# Patient Record
Sex: MALE | Race: WHITE | NOT HISPANIC OR LATINO | Employment: FULL TIME | URBAN - METROPOLITAN AREA
[De-identification: names, ages, dates, MRNs, and addresses within clinical notes are randomized per-mention and may not be internally consistent; named-entity substitution may affect disease eponyms.]

---

## 2018-01-23 ENCOUNTER — RESOLUTE PROFESSIONAL BILLING HOSPITAL PROF FEE (OUTPATIENT)
Dept: HOSPITALIST | Facility: MEDICAL CENTER | Age: 28
End: 2018-01-23

## 2018-01-23 ENCOUNTER — APPOINTMENT (OUTPATIENT)
Dept: RADIOLOGY | Facility: MEDICAL CENTER | Age: 28
DRG: 981 | End: 2018-01-23
Attending: EMERGENCY MEDICINE

## 2018-01-23 ENCOUNTER — APPOINTMENT (OUTPATIENT)
Dept: RADIOLOGY | Facility: MEDICAL CENTER | Age: 28
DRG: 981 | End: 2018-01-23
Attending: INTERNAL MEDICINE

## 2018-01-23 ENCOUNTER — HOSPITAL ENCOUNTER (INPATIENT)
Facility: MEDICAL CENTER | Age: 28
LOS: 10 days | DRG: 981 | End: 2018-02-02
Attending: EMERGENCY MEDICINE | Admitting: HOSPITALIST

## 2018-01-23 DIAGNOSIS — T50.902A INTENTIONAL DRUG OVERDOSE, INITIAL ENCOUNTER (HCC): ICD-10-CM

## 2018-01-23 PROBLEM — T14.91XA SUICIDE ATTEMPT (HCC): Status: ACTIVE | Noted: 2018-01-23

## 2018-01-23 PROBLEM — T50.901A OVERDOSE: Status: ACTIVE | Noted: 2018-01-23

## 2018-01-23 PROBLEM — R41.82 ALTERED MENTAL STATE: Status: ACTIVE | Noted: 2018-01-23

## 2018-01-23 PROBLEM — J96.90 RESPIRATORY FAILURE REQUIRING INTUBATION (HCC): Status: ACTIVE | Noted: 2018-01-23

## 2018-01-23 PROBLEM — E87.6 HYPOKALEMIA: Status: ACTIVE | Noted: 2018-01-23

## 2018-01-23 LAB
ACTION RANGE TRIGGERED IACRT: YES
ALBUMIN SERPL BCP-MCNC: 4.5 G/DL (ref 3.2–4.9)
ALBUMIN/GLOB SERPL: 1.5 G/DL
ALP SERPL-CCNC: 87 U/L (ref 30–99)
ALT SERPL-CCNC: 26 U/L (ref 2–50)
AMPHET UR QL SCN: NEGATIVE
ANION GAP SERPL CALC-SCNC: 6 MMOL/L (ref 0–11.9)
APAP SERPL-MCNC: <10 UG/ML (ref 10–30)
APPEARANCE UR: CLEAR
APTT PPP: 29 SEC (ref 24.7–36)
AST SERPL-CCNC: 17 U/L (ref 12–45)
BACTERIA #/AREA URNS HPF: NEGATIVE /HPF
BARBITURATES UR QL SCN: NEGATIVE
BASE EXCESS BLDA CALC-SCNC: -6 MMOL/L (ref -4–3)
BASOPHILS # BLD AUTO: 0.5 % (ref 0–1.8)
BASOPHILS # BLD: 0.04 K/UL (ref 0–0.12)
BENZODIAZ UR QL SCN: NEGATIVE
BILIRUB SERPL-MCNC: 0.7 MG/DL (ref 0.1–1.5)
BILIRUB UR QL STRIP.AUTO: NEGATIVE
BODY TEMPERATURE: ABNORMAL DEGREES
BUN SERPL-MCNC: 8 MG/DL (ref 8–22)
BZE UR QL SCN: NEGATIVE
CALCIUM SERPL-MCNC: 9.6 MG/DL (ref 8.5–10.5)
CANNABINOIDS UR QL SCN: NEGATIVE
CHLORIDE SERPL-SCNC: 104 MMOL/L (ref 96–112)
CO2 BLDA-SCNC: 21 MMOL/L (ref 20–33)
CO2 SERPL-SCNC: 26 MMOL/L (ref 20–33)
COLOR UR: YELLOW
CREAT SERPL-MCNC: 0.86 MG/DL (ref 0.5–1.4)
EKG IMPRESSION: NORMAL
EOSINOPHIL # BLD AUTO: 0.02 K/UL (ref 0–0.51)
EOSINOPHIL NFR BLD: 0.2 % (ref 0–6.9)
EPI CELLS #/AREA URNS HPF: NEGATIVE /HPF
ERYTHROCYTE [DISTWIDTH] IN BLOOD BY AUTOMATED COUNT: 39.9 FL (ref 35.9–50)
ETHANOL BLD-MCNC: 0.09 G/DL
GLOBULIN SER CALC-MCNC: 3.1 G/DL (ref 1.9–3.5)
GLUCOSE SERPL-MCNC: 178 MG/DL (ref 65–99)
GLUCOSE UR STRIP.AUTO-MCNC: NEGATIVE MG/DL
GRAM STN SPEC: NORMAL
HCO3 BLDA-SCNC: 20.1 MMOL/L (ref 17–25)
HCT VFR BLD AUTO: 44.9 % (ref 42–52)
HGB BLD-MCNC: 15.6 G/DL (ref 14–18)
HYALINE CASTS #/AREA URNS LPF: ABNORMAL /LPF
IMM GRANULOCYTES # BLD AUTO: 0.04 K/UL (ref 0–0.11)
IMM GRANULOCYTES NFR BLD AUTO: 0.5 % (ref 0–0.9)
INR PPP: 0.95 (ref 0.87–1.13)
INST. QUALIFIED PATIENT IIQPT: YES
KETONES UR STRIP.AUTO-MCNC: NEGATIVE MG/DL
LEUKOCYTE ESTERASE UR QL STRIP.AUTO: ABNORMAL
LIPASE SERPL-CCNC: 18 U/L (ref 11–82)
LYMPHOCYTES # BLD AUTO: 2.06 K/UL (ref 1–4.8)
LYMPHOCYTES NFR BLD: 23.9 % (ref 22–41)
MCH RBC QN AUTO: 30.9 PG (ref 27–33)
MCHC RBC AUTO-ENTMCNC: 34.7 G/DL (ref 33.7–35.3)
MCV RBC AUTO: 88.9 FL (ref 81.4–97.8)
METHADONE UR QL SCN: NEGATIVE
MICRO URNS: ABNORMAL
MONOCYTES # BLD AUTO: 0.44 K/UL (ref 0–0.85)
MONOCYTES NFR BLD AUTO: 5.1 % (ref 0–13.4)
NEUTROPHILS # BLD AUTO: 6.03 K/UL (ref 1.82–7.42)
NEUTROPHILS NFR BLD: 69.8 % (ref 44–72)
NITRITE UR QL STRIP.AUTO: NEGATIVE
NRBC # BLD AUTO: 0 K/UL
NRBC BLD-RTO: 0 /100 WBC
O2/TOTAL GAS SETTING VFR VENT: 80 %
OPIATES UR QL SCN: NEGATIVE
OXYCODONE UR QL SCN: NEGATIVE
PCO2 BLDA: 41.5 MMHG (ref 26–37)
PCO2 TEMP ADJ BLDA: 41.7 MMHG (ref 26–37)
PCP UR QL SCN: NEGATIVE
PH BLDA: 7.29 [PH] (ref 7.4–7.5)
PH TEMP ADJ BLDA: 7.29 [PH] (ref 7.4–7.5)
PH UR STRIP.AUTO: 5.5 [PH]
PLATELET # BLD AUTO: 284 K/UL (ref 164–446)
PMV BLD AUTO: 10.6 FL (ref 9–12.9)
PO2 BLDA: 355 MMHG (ref 64–87)
PO2 TEMP ADJ BLDA: 356 MMHG (ref 64–87)
POTASSIUM SERPL-SCNC: 3.1 MMOL/L (ref 3.6–5.5)
PROPOXYPH UR QL SCN: NEGATIVE
PROT SERPL-MCNC: 7.6 G/DL (ref 6–8.2)
PROT UR QL STRIP: NEGATIVE MG/DL
PROTHROMBIN TIME: 12.4 SEC (ref 12–14.6)
RBC # BLD AUTO: 5.05 M/UL (ref 4.7–6.1)
RBC # URNS HPF: ABNORMAL /HPF
RBC UR QL AUTO: NEGATIVE
SALICYLATES SERPL-MCNC: 0 MG/DL (ref 15–25)
SAO2 % BLDA: 100 % (ref 93–99)
SIGNIFICANT IND 70042: NORMAL
SITE SITE: NORMAL
SODIUM SERPL-SCNC: 136 MMOL/L (ref 135–145)
SOURCE SOURCE: NORMAL
SP GR UR STRIP.AUTO: 1.01
SPECIMEN DRAWN FROM PATIENT: ABNORMAL
TRIGL SERPL-MCNC: 118 MG/DL (ref 0–149)
UROBILINOGEN UR STRIP.AUTO-MCNC: 0.2 MG/DL
WBC # BLD AUTO: 8.6 K/UL (ref 4.8–10.8)
WBC #/AREA URNS HPF: ABNORMAL /HPF

## 2018-01-23 PROCEDURE — A9270 NON-COVERED ITEM OR SERVICE: HCPCS | Performed by: INTERNAL MEDICINE

## 2018-01-23 PROCEDURE — 87205 SMEAR GRAM STAIN: CPT

## 2018-01-23 PROCEDURE — 700105 HCHG RX REV CODE 258: Performed by: EMERGENCY MEDICINE

## 2018-01-23 PROCEDURE — 700111 HCHG RX REV CODE 636 W/ 250 OVERRIDE (IP): Performed by: EMERGENCY MEDICINE

## 2018-01-23 PROCEDURE — 31500 INSERT EMERGENCY AIRWAY: CPT

## 2018-01-23 PROCEDURE — 83690 ASSAY OF LIPASE: CPT

## 2018-01-23 PROCEDURE — 700111 HCHG RX REV CODE 636 W/ 250 OVERRIDE (IP)

## 2018-01-23 PROCEDURE — 99291 CRITICAL CARE FIRST HOUR: CPT

## 2018-01-23 PROCEDURE — 304538 HCHG NG TUBE

## 2018-01-23 PROCEDURE — 302214 INTUBATION BOX: Performed by: EMERGENCY MEDICINE

## 2018-01-23 PROCEDURE — 700111 HCHG RX REV CODE 636 W/ 250 OVERRIDE (IP): Performed by: HOSPITALIST

## 2018-01-23 PROCEDURE — 85730 THROMBOPLASTIN TIME PARTIAL: CPT

## 2018-01-23 PROCEDURE — 94760 N-INVAS EAR/PLS OXIMETRY 1: CPT

## 2018-01-23 PROCEDURE — 93005 ELECTROCARDIOGRAM TRACING: CPT | Performed by: EMERGENCY MEDICINE

## 2018-01-23 PROCEDURE — 700102 HCHG RX REV CODE 250 W/ 637 OVERRIDE(OP): Performed by: INTERNAL MEDICINE

## 2018-01-23 PROCEDURE — 302978 HCHG BRONCHOSCOPY-DIAGNOSTIC

## 2018-01-23 PROCEDURE — 81001 URINALYSIS AUTO W/SCOPE: CPT

## 2018-01-23 PROCEDURE — 0B9J8ZZ DRAINAGE OF LEFT LOWER LUNG LOBE, VIA NATURAL OR ARTIFICIAL OPENING ENDOSCOPIC: ICD-10-PCS | Performed by: INTERNAL MEDICINE

## 2018-01-23 PROCEDURE — 770022 HCHG ROOM/CARE - ICU (200)

## 2018-01-23 PROCEDURE — 94002 VENT MGMT INPAT INIT DAY: CPT

## 2018-01-23 PROCEDURE — 87070 CULTURE OTHR SPECIMN AEROBIC: CPT

## 2018-01-23 PROCEDURE — 80307 DRUG TEST PRSMV CHEM ANLYZR: CPT

## 2018-01-23 PROCEDURE — 96366 THER/PROPH/DIAG IV INF ADDON: CPT

## 2018-01-23 PROCEDURE — 74018 RADEX ABDOMEN 1 VIEW: CPT

## 2018-01-23 PROCEDURE — 5A1935Z RESPIRATORY VENTILATION, LESS THAN 24 CONSECUTIVE HOURS: ICD-10-PCS | Performed by: EMERGENCY MEDICINE

## 2018-01-23 PROCEDURE — 51702 INSERT TEMP BLADDER CATH: CPT

## 2018-01-23 PROCEDURE — 36415 COLL VENOUS BLD VENIPUNCTURE: CPT

## 2018-01-23 PROCEDURE — 99291 CRITICAL CARE FIRST HOUR: CPT | Performed by: HOSPITALIST

## 2018-01-23 PROCEDURE — 0B9F8ZZ DRAINAGE OF RIGHT LOWER LUNG LOBE, VIA NATURAL OR ARTIFICIAL OPENING ENDOSCOPIC: ICD-10-PCS | Performed by: INTERNAL MEDICINE

## 2018-01-23 PROCEDURE — 3E1G78Z IRRIGATION OF UPPER GI USING IRRIGATING SUBSTANCE, VIA NATURAL OR ARTIFICIAL OPENING: ICD-10-PCS | Performed by: EMERGENCY MEDICINE

## 2018-01-23 PROCEDURE — 0B9F8ZX DRAINAGE OF RIGHT LOWER LUNG LOBE, VIA NATURAL OR ARTIFICIAL OPENING ENDOSCOPIC, DIAGNOSTIC: ICD-10-PCS | Performed by: INTERNAL MEDICINE

## 2018-01-23 PROCEDURE — 700105 HCHG RX REV CODE 258: Performed by: HOSPITALIST

## 2018-01-23 PROCEDURE — 96368 THER/DIAG CONCURRENT INF: CPT

## 2018-01-23 PROCEDURE — 36600 WITHDRAWAL OF ARTERIAL BLOOD: CPT

## 2018-01-23 PROCEDURE — 71045 X-RAY EXAM CHEST 1 VIEW: CPT

## 2018-01-23 PROCEDURE — 80053 COMPREHEN METABOLIC PANEL: CPT

## 2018-01-23 PROCEDURE — 85610 PROTHROMBIN TIME: CPT

## 2018-01-23 PROCEDURE — 96365 THER/PROPH/DIAG IV INF INIT: CPT

## 2018-01-23 PROCEDURE — 0BH17EZ INSERTION OF ENDOTRACHEAL AIRWAY INTO TRACHEA, VIA NATURAL OR ARTIFICIAL OPENING: ICD-10-PCS | Performed by: EMERGENCY MEDICINE

## 2018-01-23 PROCEDURE — 85025 COMPLETE CBC W/AUTO DIFF WBC: CPT

## 2018-01-23 PROCEDURE — 700111 HCHG RX REV CODE 636 W/ 250 OVERRIDE (IP): Performed by: INTERNAL MEDICINE

## 2018-01-23 PROCEDURE — 84478 ASSAY OF TRIGLYCERIDES: CPT

## 2018-01-23 PROCEDURE — 303105 HCHG CATHETER EXTRA

## 2018-01-23 PROCEDURE — 82803 BLOOD GASES ANY COMBINATION: CPT

## 2018-01-23 PROCEDURE — 99232 SBSQ HOSP IP/OBS MODERATE 35: CPT | Performed by: HOSPITALIST

## 2018-01-23 RX ORDER — SODIUM CHLORIDE 9 MG/ML
1000 INJECTION, SOLUTION INTRAVENOUS ONCE
Status: COMPLETED | OUTPATIENT
Start: 2018-01-23 | End: 2018-01-23

## 2018-01-23 RX ORDER — IPRATROPIUM BROMIDE AND ALBUTEROL SULFATE 2.5; .5 MG/3ML; MG/3ML
3 SOLUTION RESPIRATORY (INHALATION)
Status: DISCONTINUED | OUTPATIENT
Start: 2018-01-23 | End: 2018-02-02 | Stop reason: HOSPADM

## 2018-01-23 RX ORDER — CHLORHEXIDINE GLUCONATE ORAL RINSE 1.2 MG/ML
15 SOLUTION DENTAL 2 TIMES DAILY
Status: DISCONTINUED | OUTPATIENT
Start: 2018-01-23 | End: 2018-01-23

## 2018-01-23 RX ORDER — BISACODYL 10 MG
10 SUPPOSITORY, RECTAL RECTAL
Status: DISCONTINUED | OUTPATIENT
Start: 2018-01-23 | End: 2018-01-23

## 2018-01-23 RX ORDER — AMOXICILLIN 250 MG
2 CAPSULE ORAL 2 TIMES DAILY
Status: DISCONTINUED | OUTPATIENT
Start: 2018-01-23 | End: 2018-01-23

## 2018-01-23 RX ORDER — ETOMIDATE 2 MG/ML
20 INJECTION INTRAVENOUS ONCE
Status: COMPLETED | OUTPATIENT
Start: 2018-01-23 | End: 2018-01-23

## 2018-01-23 RX ORDER — SODIUM CHLORIDE 9 MG/ML
INJECTION, SOLUTION INTRAVENOUS CONTINUOUS
Status: DISCONTINUED | OUTPATIENT
Start: 2018-01-23 | End: 2018-01-23

## 2018-01-23 RX ORDER — LIDOCAINE HYDROCHLORIDE 10 MG/ML
1-2 INJECTION, SOLUTION INFILTRATION; PERINEURAL
Status: DISCONTINUED | OUTPATIENT
Start: 2018-01-23 | End: 2018-01-23

## 2018-01-23 RX ORDER — SUCCINYLCHOLINE CHLORIDE 20 MG/ML
100 INJECTION INTRAMUSCULAR; INTRAVENOUS ONCE
Status: COMPLETED | OUTPATIENT
Start: 2018-01-23 | End: 2018-01-23

## 2018-01-23 RX ORDER — AMOXICILLIN 250 MG
2 CAPSULE ORAL 2 TIMES DAILY
Status: DISCONTINUED | OUTPATIENT
Start: 2018-01-23 | End: 2018-02-02 | Stop reason: HOSPADM

## 2018-01-23 RX ORDER — BISACODYL 10 MG
10 SUPPOSITORY, RECTAL RECTAL
Status: DISCONTINUED | OUTPATIENT
Start: 2018-01-23 | End: 2018-02-02 | Stop reason: HOSPADM

## 2018-01-23 RX ORDER — FAMOTIDINE 20 MG/1
20 TABLET, FILM COATED ORAL EVERY 12 HOURS
Status: DISCONTINUED | OUTPATIENT
Start: 2018-01-23 | End: 2018-01-23

## 2018-01-23 RX ORDER — ALBUTEROL SULFATE 90 UG/1
2 AEROSOL, METERED RESPIRATORY (INHALATION) EVERY 6 HOURS PRN
COMMUNITY

## 2018-01-23 RX ORDER — POLYETHYLENE GLYCOL 3350 17 G/17G
1 POWDER, FOR SOLUTION ORAL
Status: DISCONTINUED | OUTPATIENT
Start: 2018-01-23 | End: 2018-02-02 | Stop reason: HOSPADM

## 2018-01-23 RX ORDER — POTASSIUM CHLORIDE 20 MEQ/1
40 TABLET, EXTENDED RELEASE ORAL ONCE
Status: COMPLETED | OUTPATIENT
Start: 2018-01-23 | End: 2018-01-23

## 2018-01-23 RX ORDER — MIDAZOLAM HYDROCHLORIDE 1 MG/ML
2 INJECTION INTRAMUSCULAR; INTRAVENOUS ONCE
Status: DISCONTINUED | OUTPATIENT
Start: 2018-01-23 | End: 2018-01-23

## 2018-01-23 RX ORDER — POLYETHYLENE GLYCOL 3350 17 G/17G
1 POWDER, FOR SOLUTION ORAL
Status: DISCONTINUED | OUTPATIENT
Start: 2018-01-23 | End: 2018-01-23

## 2018-01-23 RX ADMIN — PROPOFOL 5 MCG/KG/MIN: 10 INJECTION, EMULSION INTRAVENOUS at 02:30

## 2018-01-23 RX ADMIN — ENOXAPARIN SODIUM 40 MG: 100 INJECTION SUBCUTANEOUS at 08:53

## 2018-01-23 RX ADMIN — STANDARDIZED SENNA CONCENTRATE AND DOCUSATE SODIUM 2 TABLET: 8.6; 5 TABLET, FILM COATED ORAL at 12:02

## 2018-01-23 RX ADMIN — SODIUM CHLORIDE 1000 ML: 9 INJECTION, SOLUTION INTRAVENOUS at 04:15

## 2018-01-23 RX ADMIN — AMPICILLIN SODIUM AND SULBACTAM SODIUM 3 G: 2; 1 INJECTION, POWDER, FOR SOLUTION INTRAMUSCULAR; INTRAVENOUS at 04:43

## 2018-01-23 RX ADMIN — SODIUM CHLORIDE 1000 ML: 9 INJECTION, SOLUTION INTRAVENOUS at 02:40

## 2018-01-23 RX ADMIN — SODIUM CHLORIDE: 9 INJECTION, SOLUTION INTRAVENOUS at 05:30

## 2018-01-23 RX ADMIN — AMPICILLIN SODIUM AND SULBACTAM SODIUM: 100; 50 INJECTION, POWDER, FOR SOLUTION INTRAVENOUS at 08:59

## 2018-01-23 RX ADMIN — POTASSIUM CHLORIDE 40 MEQ: 1500 TABLET, EXTENDED RELEASE ORAL at 12:01

## 2018-01-23 RX ADMIN — ETOMIDATE 20 MG: 2 INJECTION INTRAVENOUS at 02:30

## 2018-01-23 RX ADMIN — SUCCINYLCHOLINE CHLORIDE 100 MG: 20 INJECTION, SOLUTION INTRAMUSCULAR; INTRAVENOUS at 02:30

## 2018-01-23 RX ADMIN — FAMOTIDINE 20 MG: 10 INJECTION, SOLUTION INTRAVENOUS at 08:53

## 2018-01-23 RX ADMIN — PROPOFOL 80 MCG/KG/MIN: 10 INJECTION, EMULSION INTRAVENOUS at 04:49

## 2018-01-23 RX ADMIN — CHLORHEXIDINE GLUCONATE 15 ML: 1.2 RINSE ORAL at 08:53

## 2018-01-23 ASSESSMENT — ENCOUNTER SYMPTOMS
EYE DISCHARGE: 0
NERVOUS/ANXIOUS: 0
PALPITATIONS: 0
MEMORY LOSS: 0
SORE THROAT: 1
NAUSEA: 0
SHORTNESS OF BREATH: 0
NECK PAIN: 0
EYE PAIN: 0
STRIDOR: 0
DEPRESSION: 1
FEVER: 0
BACK PAIN: 0
WEAKNESS: 0
LOSS OF CONSCIOUSNESS: 0
ABDOMINAL PAIN: 0
FLANK PAIN: 0
SPEECH CHANGE: 0
FALLS: 0
HALLUCINATIONS: 0
COUGH: 0
DIARRHEA: 0
BLURRED VISION: 0
EYE REDNESS: 0
FOCAL WEAKNESS: 0
VOMITING: 0
BLOOD IN STOOL: 0
SPUTUM PRODUCTION: 0
ORTHOPNEA: 0
HEADACHES: 0
HEMOPTYSIS: 0
SEIZURES: 0

## 2018-01-23 ASSESSMENT — LIFESTYLE VARIABLES
REASON UNABLE TO ASSESS: OBTUNDED
DO YOU DRINK ALCOHOL: YES

## 2018-01-23 ASSESSMENT — PAIN SCALES - GENERAL
PAINLEVEL_OUTOF10: 0
PAINLEVEL_OUTOF10: 0

## 2018-01-23 NOTE — PROGRESS NOTES
0419- RN called YARELY Ruiz RN for report.    0449- ACLS RN and CCT placed Pt on monitor in [facility] and awaited RT for transport    0500- ACLS RN, RT & CCT transported Pt [facility]    0507- Pt arrived to [facility]    Wt: 81.4kg, T: 97.9F, all other vitals in flowsheet   2RN skin assessment complete    0535- RN paged SW    0522 - DIDI Kruse called back and updated to Pt status

## 2018-01-23 NOTE — PROGRESS NOTES
"Late Entry d/t pt care:    0715 During bedside report pt sat up aggressively, wiggled R hand out of restraint and self extubated. Pt on RA SpO2>90% at all times.  Pt in no s/s of respiratory distress, reoriented pt, Dr. Slater at bedside.  Pt asked if he had an active plan to commit suicide he noded his head yes and said \"to drink myself to death.\" Pt asked if he took any drugs the night before, he shook his head no. VSS.   "

## 2018-01-23 NOTE — ED NOTES
Igor Lamberth  27 y.o.  Chief Complaint   Patient presents with   • ALOC     Pt found unconscious, GCS 3, ne evidence of head trauma.   A/Ox1, actively vomiting   • Drug Overdose     Pt wrote suicide note, possible multi drug OD. +etoh     Bib ems for above stated complaint.  Pt is obtunded on arrival, actively coughin and vomiting, but unresponsive to stimulus.  ERP at bedside on arrival.  Pt intubated to maintain airway.  VSS

## 2018-01-23 NOTE — DISCHARGE PLANNING
Medical Social Work    Referral: Legal Hold    Intervention: SW received VM from unit SW regarding a legal hold for pt. No legal hold has been initiated at this time. SW left VM for unit SW and advised a hold can be started once pt is extubated, or a hold can be started now and if pt is not expected to medically clear within the next 72 hours we can notify the court and extend the hold until pt is extubated.     Plan: Legal hold SW to remain available for further needs.

## 2018-01-23 NOTE — RESPIRATORY CARE
Intubation Assist    Intubation assist performed yes  Reason for intubation airway protection  Positive Color Change on EZCap? yes  Difficult Intubation/Number of attempts no, two attempts  Evidence of aspiration no    Airway Group ET Tube Oral 8.0-Secured At  (cm): 24 (01/23/18 0246)    Allen Vent Mode: (S)CMV (01/23/18 0243)  Rate (breaths/min): 20 (01/23/18 0243)  Vt Target (mL): 490 (01/23/18 0243)  FiO2: 80 (01/23/18 0243)  PEEP/CPAP: 8 (01/23/18 0243)

## 2018-01-23 NOTE — DISCHARGE PLANNING
Received Consult for, Suicide Attempt. Pt is currently intubated. There has not been an LH initiated yet. TC to DIDI, Nikki informing her of the issue.
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
## 2018-01-23 NOTE — PROGRESS NOTES
Two RN head to toe skin assessment completed.  The following areas of concerns are noted:Hands are black and calloused.   Appropriate interventions in place.

## 2018-01-23 NOTE — ED NOTES
0233 162/129 124hr 100% NRB,  GCS 3  Pt arrives with 20g RT AC  RT at bedside, Pharm at bedside  0235 16g PIV placed Left AC  0236  Time out Done.  0236  GCS 3  0236  20mg Etom  0236  100mg Succ  0237  Intubation attept  0239  Successful  intubation   0239  160/103  86hr sinus  100%intubated  0240  Wrist restraints placed  0240 18 OG placed

## 2018-01-23 NOTE — CARE PLAN
Problem: Respiratory:  Goal: Respiratory status will improve  Encourage slow deep breathes, cough up secretions, SpO2> 90%.     Problem: Psychosocial Needs:  Goal: Level of anxiety will decrease  Discuss plan of care with pt. Keep pt safe and away from accidental harm. Discuss ways with pt to decrease anxiety.
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
## 2018-01-23 NOTE — ED NOTES
Pt tolerating tube well at this time.  Propofol running 60 mcg/min, VSS.  Pt now able to open eyes, squeeze hands on command.  NAD

## 2018-01-23 NOTE — PROGRESS NOTES
Pulmonary/Critical Care Medicine   Progress Note    Date of service: 1/23/2018  Time: 0922    Admitted to [facility]/ICU after being found in car and intubated for airway protection  Self extubated this morning  UDS was negative  BAL was 0.09  Expressing some suicidal ideation  UOP adequate  SAD score of 6  CXR(reviewed): clear lung fields  Day #1 of Unasyn  K+ of 3.1  S/p bronch    Exam:  Gen/Neuro: awake/alert, following commands, no distress, appears stated age, speaking in full sentences, following all commands, symmetrical facial expressions, moving all x 4  HEENT: perrl, eomi, conj: pink, sclera: anicteric, oral: clear pp, mmm  Neck: supple, no adenopathy, no thyromegal  CVS: RRR, no murmur, good cap refill, 2+ dpp=, no pedal edema  Lungs: clear throughout, no chest wall tenderness  EXT: FROM, no clubbing/cyanosis/edema, no bruising  Skin: warm/dry, no turgor, no rashes    A/P:  VDRF   - self extubated this morning   - no respiratory issues   - cont RT protocols  Suicidal Ideation   - legal hold placed   - psych to eval    I spent 40 min in reviewing the patient's condition, physical examination, laboratory and imaging data, prior documentation, in discussion with RT, RN, hospitalist, and pharmacy in formulating an assessment/plan.  [id number]

## 2018-01-23 NOTE — CARE PLAN
Problem: Infection  Goal: Will remain free from infection    Intervention: Assess signs and symptoms of infection  RN monitors Pt VS and lab values to observe for S/S of infection.  Hand hygiene implemented before and after Pt care.       Problem: Skin Integrity  Goal: Risk for impaired skin integrity will decrease    Intervention: Assess risk factors for impaired skin integrity and/or pressure ulcers  RN ensures to turn Pt Q2 hours and use extra pillows for support and positioning.  Waffle pillow/mattress in place if indicated.  RN to ensure Pt is not laying on any lines or other objects that might cause tissue injury.  Mepilex used on sacrum when necessary.

## 2018-01-23 NOTE — DISCHARGE PLANNING
Had treating physician, Dr. Slater sign pt's LH and Certification. Notified Nikki TOLBERT via phone message. Faxed LH to Nikki. Pt's chart had not yet been created. Left LH in bottom drawer of pt cart. Notified bedside RN.
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
## 2018-01-23 NOTE — RESPIRATORY CARE
Extubation      Stridor present no     Events/Summary/Plan: pt self extubated. sat 98% on RA. No stridor noted (01/23/18 5877)

## 2018-01-23 NOTE — PROGRESS NOTES
0604 - DIDI Kruse called with Pt's parents phone numbers and names.  RN attempted to call both numbers with no answer.  Will attempt again later.

## 2018-01-23 NOTE — DISCHARGE PLANNING
Medical Social Work    Referral: Family Search    Intervention: MSW received a page from bedside RN and returned her phone call.  RN states that pt arrived as a possible OD and is intubated; however, medically nothing seems to be going on with pt.  Pt will possible be extubated today.  MSW conducted a [employer] search and located pt's possible parents: Krystin Kapadia ([phone]) and Janes Kapadia ([phone]).  This information was provided to bedside RN.    Plan: SW will continue to follow as needed.

## 2018-01-23 NOTE — ASSESSMENT & PLAN NOTE
Patient try to commit suicide with polypharmacy overdose. He also left a suicide note. He also called his friend to let him know where to find him. The friend called paramedics and had him brought to the hospital. Patient did have gastric lavage done. The patient remains at this point on legal hold. The courts of extended the legal hold at this point to an indefinite period.

## 2018-01-23 NOTE — PROCEDURES
"Date of Service: 1/23/2018    Procedure:  Diagnostic and therapeutic bronchoscopy with BAL    Indication: Respiratory failure, ? pneumonia    Physician:  Dr. Tunde Reed MD    Post Procedure Diagnosis:  1. Mildly inflamed airways  2. No significant purulence secretions or evidence of aspiration  3. Right lower lobe and left lower lobe segments therapeutically lavaged  4.  BAL right lower lobe sent for culture    Narrative:  Appropriate consent was obtained and \"time out\" was performed.  A flexible fiberoptic bronchoscope was then inserted through the ETT without difficulty.  All airways were evaluated to the sub-segmental level.  The airway mucosa was mildly inflamed. There is no significant evidence of aspiration or purulent secretions. The right lower lobe and left lower lobe segments were therapeutically lavaged a small aliquots of saline yielding fairly clear fluid.  No endobronchial lesions were seen.  The bronchoscope was then wedged in a segment of the right lower lobe bronchus.  30cc of saline was instilled with moderate return of slightly cloudy BAL fluid.  The BAL specimen will be sent for appropriate culture.  No immediate complications.  EBL = 0.      Tunde Reed M.D.        "

## 2018-01-24 ENCOUNTER — APPOINTMENT (OUTPATIENT)
Dept: RADIOLOGY | Facility: MEDICAL CENTER | Age: 28
DRG: 981 | End: 2018-01-24
Attending: HOSPITALIST

## 2018-01-24 ENCOUNTER — APPOINTMENT (OUTPATIENT)
Dept: RADIOLOGY | Facility: MEDICAL CENTER | Age: 28
DRG: 981 | End: 2018-01-24
Attending: INTERNAL MEDICINE

## 2018-01-24 PROBLEM — R41.82 ALTERED MENTAL STATE: Status: ACTIVE | Noted: 2018-01-24

## 2018-01-24 PROBLEM — E87.6 HYPOKALEMIA: Status: RESOLVED | Noted: 2018-01-23 | Resolved: 2018-01-24

## 2018-01-24 LAB
ALBUMIN SERPL BCP-MCNC: 4 G/DL (ref 3.2–4.9)
ALBUMIN/GLOB SERPL: 2 G/DL
ALP SERPL-CCNC: 65 U/L (ref 30–99)
ALT SERPL-CCNC: 16 U/L (ref 2–50)
AST SERPL-CCNC: 16 U/L (ref 12–45)
BASOPHILS # BLD AUTO: 0.5 % (ref 0–1.8)
BASOPHILS # BLD: 0.03 K/UL (ref 0–0.12)
BILIRUB SERPL-MCNC: 1.1 MG/DL (ref 0.1–1.5)
BUN SERPL-MCNC: 7 MG/DL (ref 8–22)
CALCIUM SERPL-MCNC: 9.1 MG/DL (ref 8.5–10.5)
CHLORIDE BLD-SCNC: 107 MMOL/L (ref 96–112)
CHLORIDE SERPL-SCNC: ABNORMAL MMOL/L (ref 96–112)
CO2 SERPL-SCNC: 23 MMOL/L (ref 20–33)
CREAT SERPL-MCNC: 0.67 MG/DL (ref 0.5–1.4)
EOSINOPHIL # BLD AUTO: 0.15 K/UL (ref 0–0.51)
EOSINOPHIL NFR BLD: 2.3 % (ref 0–6.9)
ERYTHROCYTE [DISTWIDTH] IN BLOOD BY AUTOMATED COUNT: 41.8 FL (ref 35.9–50)
GLOBULIN SER CALC-MCNC: 2 G/DL (ref 1.9–3.5)
GLUCOSE SERPL-MCNC: 92 MG/DL (ref 65–99)
HCT VFR BLD AUTO: 39.1 % (ref 42–52)
HGB BLD-MCNC: 12.9 G/DL (ref 14–18)
IMM GRANULOCYTES # BLD AUTO: 0.03 K/UL (ref 0–0.11)
IMM GRANULOCYTES NFR BLD AUTO: 0.5 % (ref 0–0.9)
LYMPHOCYTES # BLD AUTO: 1.82 K/UL (ref 1–4.8)
LYMPHOCYTES NFR BLD: 27.5 % (ref 22–41)
MAGNESIUM SERPL-MCNC: 1.8 MG/DL (ref 1.5–2.5)
MCH RBC QN AUTO: 29.9 PG (ref 27–33)
MCHC RBC AUTO-ENTMCNC: 33 G/DL (ref 33.7–35.3)
MCV RBC AUTO: 90.7 FL (ref 81.4–97.8)
MONOCYTES # BLD AUTO: 0.62 K/UL (ref 0–0.85)
MONOCYTES NFR BLD AUTO: 9.4 % (ref 0–13.4)
NEUTROPHILS # BLD AUTO: 3.97 K/UL (ref 1.82–7.42)
NEUTROPHILS NFR BLD: 59.8 % (ref 44–72)
NRBC # BLD AUTO: 0 K/UL
NRBC BLD-RTO: 0 /100 WBC
PHOSPHATE SERPL-MCNC: 3.2 MG/DL (ref 2.5–4.5)
PLATELET # BLD AUTO: 212 K/UL (ref 164–446)
PMV BLD AUTO: 10.5 FL (ref 9–12.9)
POTASSIUM BLD-SCNC: 3.7 MMOL/L (ref 3.6–5.5)
POTASSIUM SERPL-SCNC: ABNORMAL MMOL/L (ref 3.6–5.5)
PROT SERPL-MCNC: 6 G/DL (ref 6–8.2)
RBC # BLD AUTO: 4.31 M/UL (ref 4.7–6.1)
SODIUM BLD-SCNC: 139 MMOL/L (ref 135–145)
SODIUM SERPL-SCNC: ABNORMAL MMOL/L (ref 135–145)
WBC # BLD AUTO: 6.6 K/UL (ref 4.8–10.8)

## 2018-01-24 PROCEDURE — 700102 HCHG RX REV CODE 250 W/ 637 OVERRIDE(OP): Performed by: PSYCHIATRY & NEUROLOGY

## 2018-01-24 PROCEDURE — 83735 ASSAY OF MAGNESIUM: CPT

## 2018-01-24 PROCEDURE — 700102 HCHG RX REV CODE 250 W/ 637 OVERRIDE(OP): Performed by: INTERNAL MEDICINE

## 2018-01-24 PROCEDURE — 84100 ASSAY OF PHOSPHORUS: CPT

## 2018-01-24 PROCEDURE — 82435 ASSAY OF BLOOD CHLORIDE: CPT

## 2018-01-24 PROCEDURE — A9270 NON-COVERED ITEM OR SERVICE: HCPCS | Performed by: INTERNAL MEDICINE

## 2018-01-24 PROCEDURE — 700111 HCHG RX REV CODE 636 W/ 250 OVERRIDE (IP): Performed by: INTERNAL MEDICINE

## 2018-01-24 PROCEDURE — 90686 IIV4 VACC NO PRSV 0.5 ML IM: CPT | Performed by: INTERNAL MEDICINE

## 2018-01-24 PROCEDURE — 85025 COMPLETE CBC W/AUTO DIFF WBC: CPT

## 2018-01-24 PROCEDURE — 84295 ASSAY OF SERUM SODIUM: CPT

## 2018-01-24 PROCEDURE — 84132 ASSAY OF SERUM POTASSIUM: CPT

## 2018-01-24 PROCEDURE — A9270 NON-COVERED ITEM OR SERVICE: HCPCS | Performed by: PSYCHIATRY & NEUROLOGY

## 2018-01-24 PROCEDURE — 99232 SBSQ HOSP IP/OBS MODERATE 35: CPT | Performed by: HOSPITALIST

## 2018-01-24 PROCEDURE — 770001 HCHG ROOM/CARE - MED/SURG/GYN PRIV*

## 2018-01-24 PROCEDURE — 3E01340 INTRODUCTION OF INFLUENZA VACCINE INTO SUBCUTANEOUS TISSUE, PERCUTANEOUS APPROACH: ICD-10-PCS | Performed by: INTERNAL MEDICINE

## 2018-01-24 PROCEDURE — 90471 IMMUNIZATION ADMIN: CPT

## 2018-01-24 PROCEDURE — 80053 COMPREHEN METABOLIC PANEL: CPT

## 2018-01-24 PROCEDURE — 71045 X-RAY EXAM CHEST 1 VIEW: CPT

## 2018-01-24 RX ORDER — ALBUTEROL SULFATE 90 UG/1
2 AEROSOL, METERED RESPIRATORY (INHALATION)
Status: DISCONTINUED | OUTPATIENT
Start: 2018-01-24 | End: 2018-01-24

## 2018-01-24 RX ORDER — ALBUTEROL SULFATE 90 UG/1
2 AEROSOL, METERED RESPIRATORY (INHALATION) EVERY 4 HOURS PRN
Status: DISCONTINUED | OUTPATIENT
Start: 2018-01-24 | End: 2018-02-02 | Stop reason: HOSPADM

## 2018-01-24 RX ORDER — ACETAMINOPHEN 325 MG/1
650 TABLET ORAL EVERY 6 HOURS PRN
Status: DISCONTINUED | OUTPATIENT
Start: 2018-01-24 | End: 2018-02-02 | Stop reason: HOSPADM

## 2018-01-24 RX ADMIN — ACETAMINOPHEN 650 MG: 325 TABLET, FILM COATED ORAL at 18:43

## 2018-01-24 RX ADMIN — SERTRALINE 50 MG: 50 TABLET, FILM COATED ORAL at 11:30

## 2018-01-24 RX ADMIN — ACETAMINOPHEN 650 MG: 325 TABLET, FILM COATED ORAL at 10:12

## 2018-01-24 RX ADMIN — INFLUENZA A VIRUS A/MICHIGAN/45/2015 X-275 (H1N1) ANTIGEN (FORMALDEHYDE INACTIVATED), INFLUENZA A VIRUS A/HONG KONG/4801/2014 X-263B (H3N2) ANTIGEN (FORMALDEHYDE INACTIVATED), INFLUENZA B VIRUS B/PHUKET/3073/2013 ANTIGEN (FORMALDEHYDE INACTIVATED), AND INFLUENZA B VIRUS B/BRISBANE/60/2008 ANTIGEN (FORMALDEHYDE INACTIVATED) 0.5 ML: 15; 15; 15; 15 INJECTION, SUSPENSION INTRAMUSCULAR at 06:40

## 2018-01-24 RX ADMIN — STANDARDIZED SENNA CONCENTRATE AND DOCUSATE SODIUM 2 TABLET: 8.6; 5 TABLET, FILM COATED ORAL at 20:07

## 2018-01-24 ASSESSMENT — ENCOUNTER SYMPTOMS
PND: 0
EYE REDNESS: 0
SORE THROAT: 1
BLOOD IN STOOL: 0
NECK PAIN: 0
SEIZURES: 0
NAUSEA: 0
BACK PAIN: 0
LOSS OF CONSCIOUSNESS: 0
EYE DISCHARGE: 0
PALPITATIONS: 0
DIARRHEA: 0
ABDOMINAL PAIN: 0
SPUTUM PRODUCTION: 0
SPEECH CHANGE: 0
SHORTNESS OF BREATH: 0
COUGH: 0
FEVER: 0
DEPRESSION: 1
DIZZINESS: 0
NERVOUS/ANXIOUS: 0
FOCAL WEAKNESS: 0
MEMORY LOSS: 0
VOMITING: 0
FLANK PAIN: 0
HALLUCINATIONS: 0
STRIDOR: 0

## 2018-01-24 ASSESSMENT — PAIN SCALES - GENERAL
PAINLEVEL_OUTOF10: 0
PAINLEVEL_OUTOF10: 2
PAINLEVEL_OUTOF10: 0
PAINLEVEL_OUTOF10: 5
PAINLEVEL_OUTOF10: 0
PAINLEVEL_OUTOF10: 4
PAINLEVEL_OUTOF10: 0
PAINLEVEL_OUTOF10: 0

## 2018-01-24 ASSESSMENT — LIFESTYLE VARIABLES
HAVE YOU EVER FELT YOU SHOULD CUT DOWN ON YOUR DRINKING: NO
EVER FELT BAD OR GUILTY ABOUT YOUR DRINKING: NO
HAVE PEOPLE ANNOYED YOU BY CRITICIZING YOUR DRINKING: NO
TOTAL SCORE: 0
ON A TYPICAL DAY WHEN YOU DRINK ALCOHOL HOW MANY DRINKS DO YOU HAVE: 5
DO YOU DRINK ALCOHOL: YES
TOTAL SCORE: 0
EVER HAD A DRINK FIRST THING IN THE MORNING TO STEADY YOUR NERVES TO GET RID OF A HANGOVER: NO
AVERAGE NUMBER OF DAYS PER WEEK YOU HAVE A DRINK CONTAINING ALCOHOL: 1
EVER_SMOKED: YES
CONSUMPTION TOTAL: POSITIVE
TOTAL SCORE: 0
HOW MANY TIMES IN THE PAST YEAR HAVE YOU HAD 5 OR MORE DRINKS IN A DAY: 365

## 2018-01-24 NOTE — PROGRESS NOTES
Pulmonary Critical Care Progress Note        Date of Service:  1/24/2018  Chief Complaint: found unresponsive in car     History of Present Illness: 27 y.o. male admitted for VDRF from attempted suicide    ROS:  Respiratory: negative, Cardiac: negative, GI: negative.  All other systems negative. C/o mild body aching    Interval Events:  24 hour interval history reviewed    - no events overnight   - continuing legal hold per psychiatry   - neuro intact   - SB/SR 50-90s   - SBPs 90-110s   - ambulating to commode   - tolerating regular meals   - CXR(reviewed): clear lung fields   - no pharmacy issues    PFSH:  No change.  Gen: pleasant/cooperative, calm, appears stated age, no distress  Skin: warm/dry, no rashes    Respiratory:     Pulse Oximetry: 95 %    Exam: unlabored respirations, no intercostal retractions or accessory muscle use and clear to auscultation without rales or wheezes  ImagingAvailable data reviewed   Recent Labs      01/23/18   0323   ISTATAPH  7.293*   ISTATAPCO2  41.5*   ISTATAPO2  355*   ISTATATCO2  21   ZBOSYPE2RKS  100*   ISTATARTHCO3  20.1   ISTATARTBE  -6*   ISTATTEMP  37.1 C   ISTATFIO2  80   ISTATSPEC  Arterial   ISTATAPHTC  7.291*   [id number]  356*       HemoDynamics:  Pulse: (!) 57, Heart Rate (Monitored): (!) 59  NIBP: (!) 91/48       Exam: regular rate and rhythm  Imaging: Available data reviewed        Neuro:  GCS Total Paterson Coma Score: 15       Exam: no focal deficits noted mental status intact cranial nerves II through XII intact gait, including heel, toe, and tandem walking normal oriented for age x3 Motor and sensory exam grossly intact  Imaging: Available data reviewed    Fluids:  Intake/Output       01/22/18 0700 - 01/23/18 0659 (Not Admitted) 01/23/18 0700 - 01/24/18 0659 01/24/18 0700 - 01/25/18 0659      [id number] 3929-0034 Total 0132-8189 3803-4057 Total 3294-7300 9160-3623 Total       Intake    P.O.  --  -- --  360  500 860  --  -- --    P.O. -- -- -- 360 500 860 -- -- --     I.V.  --  1207.5 1207.5  932.4  -- 932.4  --  -- --    Propofol Volume -- 107.5 107.5 32.4 -- 32.4 -- -- --    IV Volume (NS bolus) -- 1000 1000 -- -- -- -- -- --    IV Volume (NS) -- 100 100 900 -- 900 -- -- --    Total Intake -- 1207.5 1207.5 1292.4 500 1792.4 -- -- --       Output    Urine  --  750 750  2300  425 2725  --  -- --    Number of Times Voided -- -- -- -- 1 x 1 x -- -- --    Indwelling Cathether --  425 2725 -- -- --    Drains  --  650 650  --  -- --  --  -- --    Oral Gastric Tube -- 650 650 -- -- -- -- -- --    Stool  --  -- --  --  -- --  --  -- --    Number of Times Stooled -- -- -- -- 0 x 0 x -- -- --    Total Output -- 1400 1400 2300 425 2725 -- -- --       Net I/O     -- -192.5 -192.5 -1007.7 75 -932.7 -- -- --        Weight: 79.2 kg (174 lb 9.7 oz)  Recent Labs      18   SODIUM  136   POTASSIUM  3.1*   CHLORIDE  104   CO2  26   BUN  8   CREATININE  0.86   CALCIUM  9.6       GI/Nutrition:  Exam: abdomen is soft and non-tender, normal active bowel sounds, without masses or organomegaly  Imaging: Available data reviewed  taking PO  Liver Function  Recent Labs      18   ALTSGPT  26   ASTSGOT  17   ALKPHOSPHAT  87   TBILIRUBIN  0.7   LIPASE  18   GLUCOSE  178*       Heme:  Recent Labs      18   RBC  5.05   HEMOGLOBIN  15.6   HEMATOCRIT  44.9   PLATELETCT  284   PROTHROMBTM  12.4   APTT  29.0   INR  0.95       Infectious Disease:  Monitored Temp  Av.2 °C (99 °F)  Min: 36.5 °C (97.7 °F)  Max: 37.5 °C (99.5 °F)  Micro: reviewed  Recent Labs      18   0237   WBC  8.6   NEUTSPOLYS  69.80   LYMPHOCYTES  23.90   MONOCYTES  5.10   EOSINOPHILS  0.20   BASOPHILS  0.50   ASTSGOT  17   ALTSGPT  26   ALKPHOSPHAT  87   TBILIRUBIN  0.7     Current Facility-Administered Medications   Medication Dose Frequency Provider Last Rate Last Dose   • Respiratory Care per Protocol   Continuous RT Tunde Reed M.D.       • senna-docusate (PERICOLACE or  SENOKOT S) 8.6-50 MG per tablet 2 Tab  2 Tab BID Tunde Reed M.D.   2 Tab at 01/23/18 1202    And   • polyethylene glycol/lytes (MIRALAX) PACKET 1 Packet  1 Packet QDAY PRN Tunde Reed M.D.        And   • magnesium hydroxide (MILK OF MAGNESIA) suspension 30 mL  30 mL QDAY PRN Tunde Reed M.D.        And   • bisacodyl (DULCOLAX) suppository 10 mg  10 mg QDAY PRN Tunde Reed M.D.       • enoxaparin (LOVENOX) inj 40 mg  40 mg DAILY Tunde Reed M.D.   40 mg at 01/23/18 0853   • fentaNYL (SUBLIMAZE) injection 25 mcg  25 mcg Q HOUR PRN Tunde Reed M.D.        Or   • fentaNYL (SUBLIMAZE) injection 50 mcg  50 mcg Q HOUR PRN Tunde Reed M.D.        Or   • fentaNYL (SUBLIMAZE) injection 100 mcg  100 mcg Q HOUR PRN Tunde Reed M.D.       • ipratropium-albuterol (DUONEB) nebulizer solution 3 mL  3 mL Q2HRS PRN (RT) Tunde Reed M.D.         Last reviewed on 1/23/2018  2:59 PM by Daniel Reid    Quality  Measures:  EKG reviewed, Medications reviewed, Labs reviewed and Radiology images reviewed  Cheek catheter: No Cheek      DVT Prophylaxis: Enoxaparin (Lovenox)  DVT prophylaxis - mechanical: SCDs  Ulcer prophylaxis: Not indicated          Problems/Plan:    VDRF   - intubated 1/22   - extubated 1/23   - cont RT/O2 protocols   - cont IS as needed  Suicidal Ideation   - psych following   - legal hold in place  Prophylaxis   - lovenox  Discussed patient condition and risk of morbidity and/or mortality with RN, RT, Therapies, Pharmacy, Dietary, , Charge nurse / hot rounds, Patient and hospitalist.

## 2018-01-24 NOTE — PROGRESS NOTES
Security called to search belongings, inventory sheet filled out and signed.  Belongings remain at bedside, black iphone, brown boots, black jacket, deadpool wallet with 2 b of a cards, 1 pair of socks all placed back in pt belonging bag and remain at bedside.

## 2018-01-24 NOTE — DISCHARGE PLANNING
Discussed pt during Am Rounds. Pt has been medically cleared. Had Dr. Slater sign medical clearance for LH faxed LH and left message for Nikki TOLBERT.

## 2018-01-24 NOTE — PROGRESS NOTES
[facility] Hospitalist Progress Note    Date of Service: 2018    Chief Complaint  27 y.o. male admitted 2018 with Intentional overdose aloc and respiratory failure Interval Problem Update  Self extubated this morning  Reports overdosed intentionnaly on alcohol      Consultants/Specialty  Dr Slater  Psych      Disposition  TBD        Review of Systems   Constitutional: Negative for fever and malaise/fatigue.   HENT: Positive for sore throat. Negative for congestion, ear discharge and nosebleeds.    Eyes: Negative for blurred vision, pain, discharge and redness.   Respiratory: Negative for cough, hemoptysis, sputum production, shortness of breath and stridor.    Cardiovascular: Negative for chest pain, palpitations, orthopnea and leg swelling.   Gastrointestinal: Negative for abdominal pain, blood in stool, diarrhea, melena, nausea and vomiting.   Genitourinary: Negative for dysuria, flank pain and hematuria.   Musculoskeletal: Negative for back pain, falls, joint pain and neck pain.   Skin: Negative.    Neurological: Negative for speech change, focal weakness, seizures, loss of consciousness, weakness and headaches.   Psychiatric/Behavioral: Positive for depression and suicidal ideas. Negative for hallucinations and memory loss. The patient is not nervous/anxious.    All other systems reviewed and are negative.     Physical Exam  Laboratory/Imaging   Hemodynamics  No data recorded.   Monitored Temp: 37.5 °C (99.5 °F)  Pulse  Av.9  Min: 81  Max: 133 Heart Rate (Monitored): 93  NIBP: 134/71      Respiratory  Allen Vent Mode: Spont, Rate (breaths/min): 24, PEEP/CPAP: 8, PEEP/CPAP: 8, FiO2: 30, P Peak (PIP): 15, P MEAN: 9.8   Respiration: (!) 46, Pulse Oximetry: 96 %     Work Of Breathing / Effort: Mild  RUL Breath Sounds: Clear, RML Breath Sounds: Clear, RLL Breath Sounds: Clear, JOSE RAMON Breath Sounds: Clear, LLL Breath Sounds: Clear    Fluids    Intake/Output Summary (Last 24 hours) at 18 1558  Last  data filed at 01/23/18 1600   Gross per 24 hour   Intake          2499.84 ml   Output             2650 ml   Net          -150.16 ml       Nutrition  Orders Placed This Encounter   Procedures   • DIET ORDER     Standing Status:   Standing     Number of Occurrences:   1     Order Specific Question:   Diet:     Answer:   Regular [1]     Physical Exam   Constitutional: He is oriented to person, place, and time. He appears well-developed and well-nourished.   HENT:   Head: Normocephalic and atraumatic.   Right Ear: External ear normal.   Left Ear: External ear normal.   Mouth/Throat: No oropharyngeal exudate.   Eyes: Right eye exhibits no discharge. Left eye exhibits no discharge. No scleral icterus.   Neck: Neck supple. No JVD present. No tracheal deviation present.   Cardiovascular: Normal rate and regular rhythm.  Exam reveals no gallop and no friction rub.    No murmur heard.  Pulmonary/Chest: Effort normal and breath sounds normal. No respiratory distress. He has no wheezes. He exhibits no tenderness.   Abdominal: Soft. Bowel sounds are normal. He exhibits no distension. There is no tenderness. There is no rebound.   Musculoskeletal: He exhibits no edema or tenderness.   Neurological: He is alert and oriented to person, place, and time. No cranial nerve deficit. He exhibits normal muscle tone.   Skin: Skin is warm and dry. He is not diaphoretic. No cyanosis. Nails show no clubbing.   Psychiatric: His mood appears not anxious.   Nursing note and vitals reviewed.      Recent Labs      01/23/18 0237   WBC  8.6   RBC  5.05   HEMOGLOBIN  15.6   HEMATOCRIT  44.9   MCV  88.9   MCH  30.9   MCHC  34.7   RDW  39.9   PLATELETCT  284   MPV  10.6     Recent Labs      01/23/18 0237   SODIUM  136   POTASSIUM  3.1*   CHLORIDE  104   CO2  26   GLUCOSE  178*   BUN  8   CREATININE  0.86   CALCIUM  9.6     Recent Labs      01/23/18 0237   APTT  29.0   INR  0.95         Recent Labs      01/23/18 0237   TRIGLYCERIDE  118           Assessment/Plan     * Overdose   Assessment & Plan    Legal hold initiated  Psych eval        Respiratory failure requiring intubation (CMS-HCC)   Assessment & Plan    Resolved  CXR clear no leukocytosis or fever d/c abx         Hypokalemia   Assessment & Plan    Replete and monitor             Reviewed items::  Labs reviewed, Medications reviewed and Radiology images reviewed  DVT prophylaxis - mechanical:  SCDs
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
## 2018-01-25 ENCOUNTER — APPOINTMENT (OUTPATIENT)
Dept: RADIOLOGY | Facility: MEDICAL CENTER | Age: 28
DRG: 981 | End: 2018-01-25
Attending: INTERNAL MEDICINE

## 2018-01-25 PROBLEM — R07.89 CHEST WALL PAIN: Status: ACTIVE | Noted: 2018-01-25

## 2018-01-25 LAB
ALBUMIN SERPL BCP-MCNC: 3.6 G/DL (ref 3.2–4.9)
ALBUMIN/GLOB SERPL: 1.5 G/DL
ALP SERPL-CCNC: 73 U/L (ref 30–99)
ALT SERPL-CCNC: 20 U/L (ref 2–50)
ANION GAP SERPL CALC-SCNC: 7 MMOL/L (ref 0–11.9)
AST SERPL-CCNC: 24 U/L (ref 12–45)
BACTERIA SPEC RESP CULT: NORMAL
BILIRUB SERPL-MCNC: 0.7 MG/DL (ref 0.1–1.5)
BUN SERPL-MCNC: 8 MG/DL (ref 8–22)
CALCIUM SERPL-MCNC: 9.5 MG/DL (ref 8.4–10.2)
CHLORIDE SERPL-SCNC: 108 MMOL/L (ref 96–112)
CO2 SERPL-SCNC: 24 MMOL/L (ref 20–33)
CREAT SERPL-MCNC: 0.83 MG/DL (ref 0.5–1.4)
EKG IMPRESSION: NORMAL
GLOBULIN SER CALC-MCNC: 2.4 G/DL (ref 1.9–3.5)
GLUCOSE SERPL-MCNC: 92 MG/DL (ref 65–99)
GRAM STN SPEC: NORMAL
MAGNESIUM SERPL-MCNC: 1.8 MG/DL (ref 1.5–2.5)
PHOSPHATE SERPL-MCNC: 4 MG/DL (ref 2.5–4.5)
POTASSIUM SERPL-SCNC: 4.1 MMOL/L (ref 3.6–5.5)
PROT SERPL-MCNC: 6 G/DL (ref 6–8.2)
SIGNIFICANT IND 70042: NORMAL
SITE SITE: NORMAL
SODIUM SERPL-SCNC: 139 MMOL/L (ref 135–145)
SOURCE SOURCE: NORMAL
TRIGL SERPL-MCNC: 106 MG/DL (ref 0–149)

## 2018-01-25 PROCEDURE — 83735 ASSAY OF MAGNESIUM: CPT

## 2018-01-25 PROCEDURE — 93010 ELECTROCARDIOGRAM REPORT: CPT | Performed by: INTERNAL MEDICINE

## 2018-01-25 PROCEDURE — 700111 HCHG RX REV CODE 636 W/ 250 OVERRIDE (IP): Performed by: INTERNAL MEDICINE

## 2018-01-25 PROCEDURE — 700102 HCHG RX REV CODE 250 W/ 637 OVERRIDE(OP): Performed by: PSYCHIATRY & NEUROLOGY

## 2018-01-25 PROCEDURE — 99232 SBSQ HOSP IP/OBS MODERATE 35: CPT | Performed by: HOSPITALIST

## 2018-01-25 PROCEDURE — 93005 ELECTROCARDIOGRAM TRACING: CPT | Performed by: HOSPITALIST

## 2018-01-25 PROCEDURE — 84100 ASSAY OF PHOSPHORUS: CPT

## 2018-01-25 PROCEDURE — 770001 HCHG ROOM/CARE - MED/SURG/GYN PRIV*

## 2018-01-25 PROCEDURE — A9270 NON-COVERED ITEM OR SERVICE: HCPCS | Performed by: PSYCHIATRY & NEUROLOGY

## 2018-01-25 PROCEDURE — 80053 COMPREHEN METABOLIC PANEL: CPT

## 2018-01-25 PROCEDURE — 700102 HCHG RX REV CODE 250 W/ 637 OVERRIDE(OP): Performed by: INTERNAL MEDICINE

## 2018-01-25 PROCEDURE — 84478 ASSAY OF TRIGLYCERIDES: CPT

## 2018-01-25 PROCEDURE — A9270 NON-COVERED ITEM OR SERVICE: HCPCS | Performed by: INTERNAL MEDICINE

## 2018-01-25 RX ORDER — NICOTINE 21 MG/24HR
14 PATCH, TRANSDERMAL 24 HOURS TRANSDERMAL
Status: DISCONTINUED | OUTPATIENT
Start: 2018-01-25 | End: 2018-02-02 | Stop reason: HOSPADM

## 2018-01-25 RX ADMIN — STANDARDIZED SENNA CONCENTRATE AND DOCUSATE SODIUM 2 TABLET: 8.6; 5 TABLET, FILM COATED ORAL at 08:10

## 2018-01-25 RX ADMIN — ENOXAPARIN SODIUM 40 MG: 100 INJECTION SUBCUTANEOUS at 08:10

## 2018-01-25 RX ADMIN — NICOTINE 14 MG: 14 PATCH, EXTENDED RELEASE TRANSDERMAL at 22:20

## 2018-01-25 RX ADMIN — SERTRALINE 50 MG: 50 TABLET, FILM COATED ORAL at 08:10

## 2018-01-25 ASSESSMENT — PAIN SCALES - GENERAL
PAINLEVEL_OUTOF10: 2
PAINLEVEL_OUTOF10: 4
PAINLEVEL_OUTOF10: 0
PAINLEVEL_OUTOF10: 2
PAINLEVEL_OUTOF10: 2
PAINLEVEL_OUTOF10: 3
PAINLEVEL_OUTOF10: 2
PAINLEVEL_OUTOF10: 0
PAINLEVEL_OUTOF10: 2
PAINLEVEL_OUTOF10: 2

## 2018-01-25 ASSESSMENT — ENCOUNTER SYMPTOMS
COUGH: 0
LOSS OF CONSCIOUSNESS: 0
DIZZINESS: 0
MEMORY LOSS: 0
EYES NEGATIVE: 1
ABDOMINAL PAIN: 0
FOCAL WEAKNESS: 0
FEVER: 0
SPUTUM PRODUCTION: 0
NERVOUS/ANXIOUS: 0
DEPRESSION: 1
VOMITING: 0
SEIZURES: 0
PALPITATIONS: 0
SPEECH CHANGE: 0
HALLUCINATIONS: 0
BACK PAIN: 0
NECK PAIN: 0
SHORTNESS OF BREATH: 0
FLANK PAIN: 0
NAUSEA: 0
BLOOD IN STOOL: 0

## 2018-01-25 NOTE — DISCHARGE PLANNING
Medical Social Work    Referral: Legal Hold    Intervention: Pt is medically cleared and does not have insurance. SW sent referral to [facility] and faxed copy of legal hold separately.     Plan: Waiting on transfer to in patient psych facility.

## 2018-01-25 NOTE — DISCHARGE PLANNING
Medical Social Work    Referral: Legal Hold    Intervention: Signed petition sent to Alva Patterson's office for extension.    Plan: Pt awaiting transfer to psychiatric facility.

## 2018-01-25 NOTE — CARE PLAN
Problem: Safety  Goal: Will remain free from injury    Intervention: Provide assistance with mobility  Patient assessed Qhour and as needed, safety measures in place: bed in low position, call bell in reach, appropriate side rails up (2 lower), bed alarm on, patient room near nursing station. Will continue to monitor. Legal hold, close obs.       Problem: Pain Management  Goal: Pain level will decrease to patient's comfort goal    Intervention: Follow pain managment plan developed in collaboration with patient and Interdisciplinary Team  Patient pain assessed Qhour and as needed. Patient repositioned and medicated per Dr order. Will continue to monitor.

## 2018-01-25 NOTE — PROGRESS NOTES
Pulmonary Critical Care Progress Note        Date of Service:  1/25/2018  Chief Complaint: found unresponsive in car     History of Present Illness: 27 y.o. male admitted for VDRF from attempted suicide    ROS:  Mild chest discomfort.  No shortness of breath    Interval Events:  24 hour interval history reviewed    - no events overnight   - neuro intact, but subdued   - SB/SR 50-60s   - SBP 110s   - ambulating to the bathroom   - tolerating regular meals   - adequate UOP   - no respiratory issues   - sertraline started by psych    - no pharmacy issues    Yesterday's Events:   - no events overnight   - continuing legal hold per psychiatry   - neuro intact   - SB/SR 50-90s   - SBPs 90-110s   - ambulating to commode   - tolerating regular meals   - CXR(reviewed): clear lung fields   - no pharmacy issues    PFSH:  No change.  Gen: pleasant/cooperative, calm, appears stated age, no distress (no change)  Skin: warm/dry, no rashes (no change)    Respiratory:     Pulse Oximetry: 95 %    Exam: clear throughout, no wheezing (No change)  ImagingAvailable data reviewed   Recent Labs      01/23/18   0323   ISTATAPH  7.293*   ISTATAPCO2  41.5*   ISTATAPO2  355*   ISTATATCO2  21   [id number]  100*   ISTATARTHCO3  20.1   ISTATARTBE  -6*   ISTATTEMP  37.1 C   ISTATFIO2  80   ISTATSPEC  Arterial   ISTATAPHTC  7.291*   [id number]  356*       HemoDynamics:  Pulse: (!) 53, Heart Rate (Monitored): (!) 53  NIBP: 118/72       Exam: RRR, no murmur, no pedal edema, 2+ dpp=, good cap refill  Imaging: Available data reviewed        Neuro:  GCS Total Jerome Coma Score: 15       Exam: clear speech, CN II- XII grossly intact, motor/sensory intact, normal gait, speaking in full sentences, answering all appropriately  Imaging: Available data reviewed    Fluids:  Intake/Output       01/23/18 0700 - 01/24/18 0659 01/24/18 0700 - 01/25/18 0659 01/25/18 0700 - 01/26/18 0659      [id number] 3848-3654 Total 6328-4155 4213-3140 Total 8223-4593 4315-6507  Total       Intake    P.O.  360  500 860  900  1500 2400  --  -- --    P.O. 360 500  2400 -- -- --    I.V.  932.4  -- 932.4  --  -- --  --  -- --    Propofol Volume 32.4 -- 32.4 -- -- -- -- -- --    IV Volume (NS) 900 -- 900 -- -- -- -- -- --    Total Intake 1292.4 500 1792.4 900 1500 2400 -- -- --       Output    Urine  2300  425 2725  --  -- --  --  -- --    Number of Times Voided -- 2 x 2 x 4 x 4 x 8 x -- -- --    Indwelling Cathether 2300 425 2725 -- -- -- -- -- --    Stool  --  -- --  --  -- --  --  -- --    Number of Times Stooled -- 0 x 0 x -- 0 x 0 x -- -- --    Total Output 2300 425 2725 -- -- -- -- -- --       Net I/O     -1007.7 75 -932.7 900 1500 2400 -- -- --        Weight: 78.9 kg (173 lb 15.1 oz)  Recent Labs      18   06   SODIUM  136  See comment   POTASSIUM  3.1*  See comment   CHLORIDE  104  See comment   CO2  26  23   BUN  8  7*   CREATININE  0.86  0.67   MAGNESIUM   --   1.8   PHOSPHORUS   --   3.2   CALCIUM  9.6  9.1       GI/Nutrition:  Exam: (+)bs, soft, NT/ND, no masses  Imaging: Available data reviewed  Tolerating regular diet   Liver Function  Recent Labs      18   0626   ALTSGPT  26  16   ASTSGOT  17  16   ALKPHOSPHAT  87  65   TBILIRUBIN  0.7  1.1   LIPASE  18   --    GLUCOSE  178*  92       Heme:  Recent Labs      18   0626   RBC  5.05  4.31*   HEMOGLOBIN  15.6  12.9*   HEMATOCRIT  44.9  39.1*   PLATELETCT  284  212   PROTHROMBTM  12.4   --    APTT  29.0   --    INR  0.95   --        Infectious Disease:  Temp  Av.7 °C (98 °F)  Min: 36.3 °C (97.3 °F)  Max: 37.1 °C (98.7 °F)  Micro: reviewed  Recent Labs      18   0237  18   0626   WBC  8.6  6.6   NEUTSPOLYS  69.80  59.80   LYMPHOCYTES  23.90  27.50   MONOCYTES  5.10  9.40   EOSINOPHILS  0.20  2.30   BASOPHILS  0.50  0.50   ASTSGOT  17  16   ALTSGPT  26  16   ALKPHOSPHAT  87  65   TBILIRUBIN  0.7  1.1     Current Facility-Administered  Medications   Medication Dose Frequency Provider Last Rate Last Dose   • acetaminophen (TYLENOL) tablet 650 mg  650 mg Q6HRS PRN Carole Slater M.D.   650 mg at 01/24/18 1843   • sertraline (ZOLOFT) tablet 50 mg  50 mg DAILY Greg Spence D.O.   50 mg at 01/24/18 1130   • albuterol inhaler 2 Puff  2 Puff Q4HRS PRN Bandar Cerda M.D.       • Respiratory Care per Protocol   Continuous RT Tunde Reed M.D.       • senna-docusate (PERICOLACE or SENOKOT S) 8.6-50 MG per tablet 2 Tab  2 Tab BID Tunde Reed M.D.   2 Tab at 01/24/18 2007    And   • polyethylene glycol/lytes (MIRALAX) PACKET 1 Packet  1 Packet QDAY PRN Tunde Reed M.D.        And   • magnesium hydroxide (MILK OF MAGNESIA) suspension 30 mL  30 mL QDAY PRN Tunde Reed M.D.        And   • bisacodyl (DULCOLAX) suppository 10 mg  10 mg QDAY PRN Tunde Reed M.D.       • enoxaparin (LOVENOX) inj 40 mg  40 mg DAILY Tunde Reed M.D.   Stopped at 01/24/18 0900   • ipratropium-albuterol (DUONEB) nebulizer solution 3 mL  3 mL Q2HRS PRN (RT) Tunde Reed M.D.         Last reviewed on 1/24/2018  5:37 AM by Michelle Chong R.N.    Quality  Measures:  EKG reviewed, Medications reviewed, Labs reviewed and Radiology images reviewed  Cheek catheter: No Cheek      DVT Prophylaxis: Enoxaparin (Lovenox)  DVT prophylaxis - mechanical: SCDs  Ulcer prophylaxis: Not indicated          Problems/Plan:    VDRF   - intubated 1/22   - extubated 1/23   - cont RT/O2 protocols   - cont IS as needed  Suicidal Ideation   - psych following   - legal hold in place   - awaiting transfer to inpatient psych facility  Prophylaxis   - lovenox    Pt remains stable and medically cleared for transfer to an inpatient psych unit.    Discussed patient condition and risk of morbidity and/or mortality with RN, RT, Therapies, Pharmacy, Dietary, , Charge nurse / hot rounds, Patient and hospitalist.    [id number]

## 2018-01-25 NOTE — DISCHARGE PLANNING
Legal Hold    Date of Legal Hold: 18  Time of Legal Hold: 1204    Where was patient when legal hold initiated: Admitted to [facility]    Legal Hold will : 18 1700    Medical Clearance Complete: yes    Psychiatric Certification Complete: yes    Paperwork sent to  for extension: yes 18 3753    What doctor will be signing the extension: Dr. Spence    Extension paperwork attained: In process    Will patient present to [facility] mental health on Wednesday morning?: yes    Referral placed to Psychiatric Facility: yes    Ongoing Plan: Pt waiting on transfer to psychiatric facility.

## 2018-01-25 NOTE — CARE PLAN
Problem: Safety  Goal: Will remain free from injury    Intervention: Provide assistance with mobility  Pt ambulating with stand-by assistance. Pt steady and mobilizing well without difficulty.      Problem: Respiratory:  Goal: Respiratory status will improve    Intervention: Educate and encourage incentive spirometry usage  IS explained and given to patient, Hourly use discussed. Great effort noted at 9959-2614 cc. Pt has intermittent cough, no sputum at this time.       Problem: Safety:  Goal: Will remain free from injury    Intervention: Provide assistance with mobility  Pt ambulating with stand-by assistance. Pt steady and mobilizing well without difficulty.      Problem: Psychosocial Needs:  Goal: Ability to identify and develop effective coping behavior will improve    Intervention: Provide emotional support  Discussed with patient Q 15 min close observation monitoring and no self harm.  Emotional support offered/provided.

## 2018-01-25 NOTE — PROGRESS NOTES
[facility] Hospitalist Progress Note    Date of Service: 2018    Chief Complaint  27 y.o. male admitted 2018 with Intentional overdose aloc and respiratory failure Interval Problem Update  On room air  No overnight events tolerating diet  Consultants/Specialty  Dr Slater  Psych      Disposition  TBD        Review of Systems   Constitutional: Negative for fever.   HENT: Positive for sore throat (improved). Negative for congestion, ear discharge, hearing loss, nosebleeds and tinnitus.    Eyes: Negative for discharge and redness.   Respiratory: Negative for cough, sputum production, shortness of breath and stridor.    Cardiovascular: Negative for chest pain, palpitations, leg swelling and PND.   Gastrointestinal: Negative for abdominal pain, blood in stool, diarrhea, melena, nausea and vomiting.   Genitourinary: Negative for flank pain and hematuria.   Musculoskeletal: Negative for back pain and neck pain.   Skin: Negative.    Neurological: Negative for dizziness, speech change, focal weakness, seizures and loss of consciousness.   Psychiatric/Behavioral: Positive for depression. Negative for hallucinations and memory loss. The patient is not nervous/anxious.    All other systems reviewed and are negative.     Physical Exam  Laboratory/Imaging   Hemodynamics  Temp (24hrs), Av.8 °C (98.2 °F), Min:36.7 °C (98 °F), Max:37.1 °C (98.7 °F)   Temperature: 36.7 °C (98 °F), Monitored Temp: 37.4 °C (99.3 °F)  Pulse  Av.3  Min: 51  Max: 133 Heart Rate (Monitored): 65  NIBP: 120/72      Respiratory      Respiration: 19, Pulse Oximetry: 96 %     Work Of Breathing / Effort: Mild  RUL Breath Sounds: Clear, RML Breath Sounds: Clear, RLL Breath Sounds: Clear, JOSE RAMON Breath Sounds: Clear, LLL Breath Sounds: Clear    Fluids    Intake/Output Summary (Last 24 hours) at 18 1653  Last data filed at 18 1600   Gross per 24 hour   Intake             1300 ml   Output             1125 ml   Net              175 ml        Nutrition  Orders Placed This Encounter   Procedures   • DIET ORDER     Standing Status:   Standing     Number of Occurrences:   1     Order Specific Question:   Diet:     Answer:   Regular [1]     Physical Exam   Constitutional: He is oriented to person, place, and time. He appears well-developed and well-nourished.   HENT:   Head: Normocephalic and atraumatic.   Right Ear: External ear normal.   Left Ear: External ear normal.   Mouth/Throat: No oropharyngeal exudate.   Eyes: Conjunctivae are normal. Right eye exhibits no discharge. Left eye exhibits no discharge. No scleral icterus.   Neck: Neck supple. No JVD present. No tracheal deviation present.   Cardiovascular: Normal rate and regular rhythm.  Exam reveals no gallop and no friction rub.    No murmur heard.  Pulmonary/Chest: Effort normal and breath sounds normal. No stridor. No respiratory distress. He has no wheezes. He has no rales. He exhibits no tenderness.   Abdominal: Soft. Bowel sounds are normal. He exhibits no distension. There is no tenderness. There is no rebound and no guarding.   Musculoskeletal: He exhibits no edema or tenderness.   Neurological: He is alert and oriented to person, place, and time. No cranial nerve deficit. He exhibits normal muscle tone.   Skin: Skin is warm and dry. He is not diaphoretic. No cyanosis or erythema. Nails show no clubbing.   Psychiatric: His mood appears not anxious. He exhibits a depressed mood.   Nursing note and vitals reviewed.      Recent Labs      01/23/18 0237 01/24/18   0626   WBC  8.6  6.6   RBC  5.05  4.31*   HEMOGLOBIN  15.6  12.9*   HEMATOCRIT  44.9  39.1*   MCV  88.9  90.7   MCH  30.9  29.9   MCHC  34.7  33.0*   RDW  39.9  41.8   PLATELETCT  284  212   MPV  10.6  10.5     Recent Labs      01/23/18   0237  01/24/18   0626   SODIUM  136  See comment   POTASSIUM  3.1*  See comment   CHLORIDE  104  See comment   CO2  26  23   GLUCOSE  178*  92   BUN  8  7*   CREATININE  0.86  0.67   CALCIUM   9.6  9.1     Recent Labs      01/23/18   0237   APTT  29.0   INR  0.95         Recent Labs      01/23/18   0237   TRIGLYCERIDE  118          Assessment/Plan     * Overdose   Assessment & Plan    Legal hold initiated  Evaluated by psychiatry legal hold extended discussed with Dr. Galdamez        Respiratory failure requiring intubation (CMS-[facility])   Assessment & Plan    Resolved          Altered mental state   Assessment & Plan    resolved            Reviewed items::  Labs reviewed, Medications reviewed and Radiology images reviewed  Cheek catheter::  No Cheek  DVT prophylaxis - mechanical:  SCDs

## 2018-01-25 NOTE — PROGRESS NOTES
Dr Baron and team here for morning rounds, plan of care was discussed. Informed MD's of EKG with ST elevation in Lead II, 12 lead ordered. Pt still waiting for room transfer.

## 2018-01-26 ENCOUNTER — APPOINTMENT (OUTPATIENT)
Dept: RADIOLOGY | Facility: MEDICAL CENTER | Age: 28
DRG: 981 | End: 2018-01-26
Attending: INTERNAL MEDICINE

## 2018-01-26 PROBLEM — J96.01 ACUTE RESPIRATORY FAILURE WITH HYPOXIA (HCC): Status: ACTIVE | Noted: 2018-01-23

## 2018-01-26 PROBLEM — J45.20 MILD INTERMITTENT ASTHMA WITHOUT COMPLICATION: Status: ACTIVE | Noted: 2018-01-26

## 2018-01-26 PROCEDURE — 700102 HCHG RX REV CODE 250 W/ 637 OVERRIDE(OP): Performed by: PSYCHIATRY & NEUROLOGY

## 2018-01-26 PROCEDURE — 770001 HCHG ROOM/CARE - MED/SURG/GYN PRIV*

## 2018-01-26 PROCEDURE — 99232 SBSQ HOSP IP/OBS MODERATE 35: CPT | Performed by: HOSPITALIST

## 2018-01-26 PROCEDURE — A9270 NON-COVERED ITEM OR SERVICE: HCPCS | Performed by: PSYCHIATRY & NEUROLOGY

## 2018-01-26 PROCEDURE — 700111 HCHG RX REV CODE 636 W/ 250 OVERRIDE (IP): Performed by: INTERNAL MEDICINE

## 2018-01-26 RX ADMIN — SERTRALINE 50 MG: 50 TABLET, FILM COATED ORAL at 08:50

## 2018-01-26 RX ADMIN — ENOXAPARIN SODIUM 40 MG: 100 INJECTION SUBCUTANEOUS at 08:51

## 2018-01-26 ASSESSMENT — PAIN SCALES - GENERAL
PAINLEVEL_OUTOF10: 0

## 2018-01-26 ASSESSMENT — ENCOUNTER SYMPTOMS
COUGH: 0
WEIGHT LOSS: 0
EYE PAIN: 0
VOMITING: 0
HEMOPTYSIS: 0
SPUTUM PRODUCTION: 0
HALLUCINATIONS: 0
LOSS OF CONSCIOUSNESS: 0
FLANK PAIN: 0
PND: 0
BLURRED VISION: 0
CHILLS: 0
EYE DISCHARGE: 0
SEIZURES: 0
BLOOD IN STOOL: 0
HEADACHES: 0
MEMORY LOSS: 0
EYE REDNESS: 0
FEVER: 0
WHEEZING: 0
WEAKNESS: 0
STRIDOR: 0
NAUSEA: 0
ORTHOPNEA: 0
SPEECH CHANGE: 0
FALLS: 0
DEPRESSION: 1
NECK PAIN: 0
BACK PAIN: 0
FOCAL WEAKNESS: 0
NERVOUS/ANXIOUS: 0
SHORTNESS OF BREATH: 0
DIARRHEA: 0
ABDOMINAL PAIN: 0

## 2018-01-26 NOTE — CARE PLAN
Problem: Safety  Goal: Will remain free from injury  Outcome: PROGRESSING AS EXPECTED      Problem: Safety:  Goal: Will remain free from injury  Outcome: PROGRESSING AS EXPECTED

## 2018-01-26 NOTE — CARE PLAN
Problem: Respiratory:  Goal: Respiratory status will improve  Outcome: MET Date Met: 01/26/18

## 2018-01-26 NOTE — DISCHARGE PLANNING
Medical Social Work    Referral: Legal Hold    Intervention: SW received copy of filed extension petition. Copy sent to unit SW to place in pt's chart.     Plan: Pt awaiting transfer to psychiatric facility.

## 2018-01-26 NOTE — CARE PLAN
Problem: Safety  Goal: Will remain free from injury  Outcome: PROGRESSING AS EXPECTED  Sitter at bedside. Q15 minute obs in place.    Problem: Smoking Cessation  Intervention: MEDS TO AID WITH SMOKING CESSATION PER ORDER  Nicotine patch started.

## 2018-01-26 NOTE — PROGRESS NOTES
[facility] Hospitalist Progress Note    Date of Service: 2018    Chief Complaint  27 y.o. male admitted 2018 with Intentional overdose aloc and respiratory failure Interval Problem Update   Complained of chest wall pain reproducible on exam with palpation  Tolerating diet  Consultants/Specialty  Dr Slater  Psych      Disposition  Transfer to psych facility        Review of Systems   Constitutional: Negative for chills, fever and weight loss.   HENT: Negative.  Negative for hearing loss and tinnitus.    Eyes: Negative.    Respiratory: Negative for cough, sputum production, shortness of breath and wheezing.    Cardiovascular: Positive for chest pain (chest wall). Negative for palpitations, leg swelling and PND.   Gastrointestinal: Negative for abdominal pain, blood in stool, melena, nausea and vomiting.   Genitourinary: Negative for dysuria, flank pain, hematuria and urgency.   Musculoskeletal: Negative for back pain, falls and neck pain.   Skin: Negative.    Neurological: Negative for dizziness, speech change, focal weakness, seizures, loss of consciousness and headaches.   Psychiatric/Behavioral: Positive for depression. Negative for hallucinations and memory loss. The patient is not nervous/anxious.    All other systems reviewed and are negative.     Physical Exam  Laboratory/Imaging   Hemodynamics  Temp (24hrs), Av.7 °C (98 °F), Min:36.2 °C (97.2 °F), Max:37 °C (98.6 °F)   Temperature: 37 °C (98.6 °F)  Pulse  Av.1  Min: 46  Max: 133 Heart Rate (Monitored): 83  NIBP: 127/75      Respiratory      Respiration: 12, Pulse Oximetry: 98 %     Work Of Breathing / Effort: Mild;Shallow (hourly IS use encouraged)  RUL Breath Sounds: Clear, RML Breath Sounds: Clear;Diminished, RLL Breath Sounds: Clear;Diminished, JOSE RAMON Breath Sounds: Clear, LLL Breath Sounds: Clear;Diminished    Fluids    Intake/Output Summary (Last 24 hours) at 18 1632  Last data filed at 18 1500   Gross per 24 hour   Intake              2790 ml   Output                0 ml   Net             2790 ml       Nutrition  Orders Placed This Encounter   Procedures   • DIET ORDER     Standing Status:   Standing     Number of Occurrences:   1     Order Specific Question:   Diet:     Answer:   Regular [1]     Physical Exam   Constitutional: He is oriented to person, place, and time. He appears well-developed and well-nourished.   HENT:   Head: Normocephalic and atraumatic.   Right Ear: External ear normal.   Mouth/Throat: No oropharyngeal exudate.   Eyes: Right eye exhibits no discharge. Left eye exhibits no discharge. No scleral icterus.   Neck: Neck supple. No JVD present. No tracheal deviation present.   Cardiovascular: Normal rate and regular rhythm.  Exam reveals no gallop and no friction rub.    No murmur heard.  Pulmonary/Chest: Effort normal and breath sounds normal. No respiratory distress. He has no wheezes. He exhibits tenderness (reproducing symptoms).   Abdominal: Soft. Bowel sounds are normal. He exhibits no distension. There is no tenderness. There is no rebound.   Musculoskeletal: He exhibits no edema or tenderness.   Neurological: He is alert and oriented to person, place, and time. No cranial nerve deficit. He exhibits normal muscle tone.   Skin: Skin is warm and dry. He is not diaphoretic. No cyanosis or erythema. Nails show no clubbing.   Psychiatric: Cognition and memory are normal. He exhibits a depressed mood.   Nursing note and vitals reviewed.      Recent Labs      01/23/18 0237 01/24/18   0626   WBC  8.6  6.6   RBC  5.05  4.31*   HEMOGLOBIN  15.6  12.9*   HEMATOCRIT  44.9  39.1*   MCV  88.9  90.7   MCH  30.9  29.9   MCHC  34.7  33.0*   RDW  39.9  41.8   PLATELETCT  284  212   MPV  10.6  10.5     Recent Labs      01/23/18 0237 01/24/18   0626  01/25/18   0540   SODIUM  136  See comment  139   POTASSIUM  3.1*  See comment  4.1   CHLORIDE  104  See comment  108   CO2  26  23  24   GLUCOSE  178*  92  92   BUN  8  7*  8    CREATININE  0.86  0.67  0.83   CALCIUM  9.6  9.1  9.5     Recent Labs      01/23/18   0237   APTT  29.0   INR  0.95         Recent Labs      01/23/18   0237  01/25/18   0540   TRIGLYCERIDE  118  106          Assessment/Plan     Overdose   Assessment & Plan     Legal hold   Evaluated by psychiatry legal hold extended and started on Zoloft          Respiratory failure requiring intubation (CMS-[facility])   Assessment & Plan     Resolved             Chest wall pain   Assessment & Plan     Musculoskeletal in etiology  Tylenol as needed  EKG with no acute ischemic changes             Altered mental state   Assessment & Plan     resolved                                                                          Reviewed items::  Labs reviewed, Medications reviewed and Radiology images reviewed  Cheek catheter::  No Cheek  DVT prophylaxis - mechanical:  SCDs

## 2018-01-27 ENCOUNTER — APPOINTMENT (OUTPATIENT)
Dept: RADIOLOGY | Facility: MEDICAL CENTER | Age: 28
DRG: 981 | End: 2018-01-27
Attending: INTERNAL MEDICINE

## 2018-01-27 PROCEDURE — 770001 HCHG ROOM/CARE - MED/SURG/GYN PRIV*

## 2018-01-27 PROCEDURE — A9270 NON-COVERED ITEM OR SERVICE: HCPCS | Performed by: PSYCHIATRY & NEUROLOGY

## 2018-01-27 PROCEDURE — 700111 HCHG RX REV CODE 636 W/ 250 OVERRIDE (IP): Performed by: INTERNAL MEDICINE

## 2018-01-27 PROCEDURE — 700102 HCHG RX REV CODE 250 W/ 637 OVERRIDE(OP): Performed by: INTERNAL MEDICINE

## 2018-01-27 PROCEDURE — 71045 X-RAY EXAM CHEST 1 VIEW: CPT

## 2018-01-27 PROCEDURE — 700102 HCHG RX REV CODE 250 W/ 637 OVERRIDE(OP): Performed by: PSYCHIATRY & NEUROLOGY

## 2018-01-27 PROCEDURE — 99231 SBSQ HOSP IP/OBS SF/LOW 25: CPT | Performed by: HOSPITALIST

## 2018-01-27 PROCEDURE — A9270 NON-COVERED ITEM OR SERVICE: HCPCS | Performed by: INTERNAL MEDICINE

## 2018-01-27 RX ADMIN — SERTRALINE 50 MG: 50 TABLET, FILM COATED ORAL at 09:18

## 2018-01-27 RX ADMIN — NICOTINE 14 MG: 14 PATCH, EXTENDED RELEASE TRANSDERMAL at 05:43

## 2018-01-27 RX ADMIN — ENOXAPARIN SODIUM 40 MG: 100 INJECTION SUBCUTANEOUS at 09:19

## 2018-01-27 ASSESSMENT — ENCOUNTER SYMPTOMS
LOSS OF CONSCIOUSNESS: 0
FOCAL WEAKNESS: 0
SPEECH CHANGE: 0
NERVOUS/ANXIOUS: 0
SEIZURES: 0
PALPITATIONS: 0
SPUTUM PRODUCTION: 0
NECK PAIN: 0
FEVER: 0
COUGH: 0
BACK PAIN: 0
HEMOPTYSIS: 0
FLANK PAIN: 0
CHILLS: 0
EYE DISCHARGE: 0
GASTROINTESTINAL NEGATIVE: 1
WHEEZING: 0
HEADACHES: 0
EYE REDNESS: 0
HALLUCINATIONS: 0
ORTHOPNEA: 0
DEPRESSION: 1

## 2018-01-27 ASSESSMENT — PAIN SCALES - GENERAL
PAINLEVEL_OUTOF10: 0

## 2018-01-27 NOTE — PSYCHIATRY
"PSYCHIATRIC FOLLOW UP:    Reason for Admission: Altered mental state, Respiratory failure requiring intubation, Suicide attempt  Legal hold status:  Involuntary   Psychiatric Supervising Attending:   Mayelin Bettencourt MD       HPI:    Pt continues to present as somewhat bland of emotion.  He states he is doing \"ok I guess.\"  He continues to subconsciously minimize his symptoms and the severity of his attempt.  He is agreeable to transfer to inpatient facility.  When pressed he states \"I guess it might be good for me to learn better coping skills.\"  He has spoken with his parents and ex-girlfriend, which stated went \"good.\"   He denies adverse effects from Zoloft.      Psychiatric Examination: observed phenomenon:  Vitals: Pulse 60, temperature 36.9 °C (98.5 °F), resp. rate 14, height 1.88 m (6' 2\"), weight 77.6 kg (171 lb 1.2 oz), SpO2 95 %.  Musculoskeletal: normal psychomotor activity, no tics or unusual mannerisms noted  Appearance: WDWN, appropriate dress and grooming. Behavior is calm, cooperative,  appropriate eye contact  Thoughts:  Linear, logical, no blocking of thought noted, no delusional content noted, not obviously responding to internal stimuli.  Speech: Normal rate and crista, no pressuring of speech noted  Mood:   \"ok I guess\"         Affect:  Slightly dysphoric          SI/HI: denies, no evidence of active SI/HI noted   Attention/Alertness: Alert and attentive   Memory: Recent and remote memory grossly intact     Orientation: A&Ox3     Fund of Knowledge:  Did not test   Insight/Judgement into symptoms:  Fair    Neurological Testing: Not formally tested     Assessment:  Mr. Kapadia is a 28 y/o hospitalized after a suicide attempt via overdose.  This was his first suicide attempt, and it appears he meant to complete.  He presents well, but most likely he is sub-consciously minimizing his symptoms.  His situation is complex, especially in regards to his family dynamic.  He denies current suicidal " ideation, but most likely will need additional time to regroup and recover emotionally.  Inpatient transfer is recommended.     #adjustment disorder with depressed mood  #alcohol use disorder, mild          Plan:  -recommend transfer to inpatient psychiatric facility     legal hold:  Involuntary   Will follow    Patient seen and discussed with resident. Agree with resident assessment and plan.   Over 30 minutes spent in counseling. Pt participated in session focused on increasing psychological flexibility.   Discussed mindfulness as non-judgmental [id number] self-awareness and how this practice can reduce the experiential distress of psychological symptoms. Pt participated in brief mindfulness exercise and was able to express understanding of concept.

## 2018-01-27 NOTE — CARE PLAN
Problem: Bowel/Gastric:  Goal: Normal bowel function is maintained or improved  Outcome: MET Date Met: 01/27/18

## 2018-01-27 NOTE — PROGRESS NOTES
Pulmonary Critical Care Progress Note        Date of Service:  1/27/2018  Chief Complaint: found unresponsive in car     History of Present Illness: 27 y.o. male admitted for VDRF from attempted suicide    ROS:  No complaints today.  Feeling good with improved mood    Interval Events:  24 hour interval history reviewed    - no events overnight   - neuro intact   - improved mood   - SR 80s   - SBPs 100-110s   - mobilizing around hospitla      Yesterday's Events:   - no events overnight   - neuro intact   - SR 60s   - SBPs 100-140s   - ambulating around ICU and to the bathroom   - still tolerating regular meals   - adequate UOP   - no respiratory issues   - tolerating sertraline   - no pharmacy issues    PFSH:  No change.  Gen: pleasant/cooperative, well developed and well nourished, seems his mood is improved, no distress  Skin: warm/dry, no rashes (no change)    Respiratory:     Pulse Oximetry: 96 %    Exam: clear throughout, no wheezing (no change)  ImagingAvailable data reviewed         Invalid input(s): [id number]    HemoDynamics:  Pulse: 83, Heart Rate (Monitored): 81  NIBP: 114/70       Exam: RRR, no murmur, no pedal edema, 2+ dpp=, good cap refill (unchanged)  Imaging: Available data reviewed        Neuro:  GCS Total [place] Coma Score: 15       Exam: clear speech, CN II- XII grossly intact, motor/sensory intact, normal gait, speaking in full sentences, answering all appropriately (no changes)  Imaging: Available data reviewed    Fluids:  Intake/Output       01/25/18 0700 - 01/26/18 0659 01/26/18 0700 - 01/27/18 0659 01/27/18 0700 - 01/28/18 0659      [id number] 0024-5227 Total 6996-4991 4786-9820 Total 8820-8481 7361-7213 Total       Intake    P.O.  1530  750 2280  --  -- --  --  -- --    P.O. 4973 538 0997 -- -- -- -- -- --    I.V.  0  -- 0  --  -- --  --  -- --    Propofol Volume 0 -- 0 -- -- -- -- -- --    IV Volume (NS) 0 -- 0 -- -- -- -- -- --    Total Intake 9038 168 8364 -- -- -- -- -- --        Output    Urine  --  -- --  --  -- --  --  -- --    Number of Times Voided 4 x 2 x 6 x 3 x 3 x 6 x -- -- --    Stool  --  -- --  --  -- --  --  -- --    Number of Times Stooled 1 x 0 x 1 x -- -- -- -- -- --    Total Output -- -- -- -- -- -- -- -- --       Net I/O     7389 940 0448 -- -- -- -- -- --           Recent Labs      18   0618   0540   SODIUM  See comment  139   POTASSIUM  See comment  4.1   CHLORIDE  See comment  108   CO2  23  24   BUN  7*  8   CREATININE  0.67  0.83   MAGNESIUM  1.8  1.8   PHOSPHORUS  3.2  4.0   CALCIUM  9.1  9.5       GI/Nutrition:  Exam: (+)bs, soft, NT/ND, no masses (no change)  Imaging: Available data reviewed  Tolerating regular diet   Liver Function  Recent Labs      18   0626  18   0540   ALTSGPT  16  20   ASTSGOT  16  24   ALKPHOSPHAT  65  73   TBILIRUBIN  1.1  0.7   GLUCOSE  92  92       Heme:  Recent Labs      18   RBC  4.31*   HEMOGLOBIN  12.9*   HEMATOCRIT  39.1*   PLATELETCT  212       Infectious Disease:  Temp  Av °C (98.6 °F)  Min: 36.9 °C (98.5 °F)  Max: 37 °C (98.6 °F)  Micro:reviewed  Recent Labs      18   0540   WBC  6.6   --    NEUTSPOLYS  59.80   --    LYMPHOCYTES  27.50   --    MONOCYTES  9.40   --    EOSINOPHILS  2.30   --    BASOPHILS  0.50   --    ASTSGOT  16  24   ALTSGPT  16  20   ALKPHOSPHAT  65  73   TBILIRUBIN  1.1  0.7     Current Facility-Administered Medications   Medication Dose Frequency Provider Last Rate Last Dose   • nicotine (NICODERM) 14 MG/24HR 14 mg  14 mg Daily-0600 Tunde Reed M.D.   14 mg at 18 2220   • acetaminophen (TYLENOL) tablet 650 mg  650 mg Q6HRS PRN Carole Slater M.D.   650 mg at 18 1843   • sertraline (ZOLOFT) tablet 50 mg  50 mg DAILY Greg Spence D.O.   50 mg at 18 0850   • albuterol inhaler 2 Puff  2 Puff Q4HRS PRN Bandar Cerda M.D.       • Respiratory Care per Protocol   Continuous RT Tunde Reed M.D.       •  senna-docusate (PERICOLACE or SENOKOT S) 8.6-50 MG per tablet 2 Tab  2 Tab BID Tunde Reed M.D.   Stopped at 01/26/18 0900    And   • polyethylene glycol/lytes (MIRALAX) PACKET 1 Packet  1 Packet QDAY PRN Tunde Reed M.D.        And   • magnesium hydroxide (MILK OF MAGNESIA) suspension 30 mL  30 mL QDAY PRN Tunde Reed M.D.        And   • bisacodyl (DULCOLAX) suppository 10 mg  10 mg QDAY PRN Tunde Reed M.D.       • enoxaparin (LOVENOX) inj 40 mg  40 mg DAILY Tunde Reed M.D.   40 mg at 01/26/18 0851   • ipratropium-albuterol (DUONEB) nebulizer solution 3 mL  3 mL Q2HRS PRN (RT) Tunde Reed M.D.         Last reviewed on 1/24/2018  5:37 AM by Michelle Chong R.N.    Quality  Measures:  EKG reviewed, Medications reviewed, Labs reviewed and Radiology images reviewed  Cheek catheter: No Cheek      DVT Prophylaxis: Enoxaparin (Lovenox)  DVT prophylaxis - mechanical: SCDs  Ulcer prophylaxis: Not indicated          Problems/Plan:    VDRF   - intubated 1/22   - extubated 1/23   - cont RT/O2 protocols   - cont IS as needed  Suicidal Ideation   - psych following   - legal hold in place   - awaiting transfer to inpatient psych facility  Depression   - sertraline  Prophylaxis   - lovenox    Pt's exam, assessment, and plan remain unchanged.  He is still medically cleared for transfer to an inpatient psych facility.    Discussed patient condition and risk of morbidity and/or mortality with RN, RT, Therapies, Pharmacy, Dietary, , Charge nurse / hot rounds, Patient and hospitalist.    [id number]

## 2018-01-27 NOTE — PROGRESS NOTES
[facility] Hospitalist Progress Note    Date of Service: 2018    Chief Complaint  27 y.o. male admitted 2018 with Intentional overdose aloc and respiratory failure Interval Problem Update  No overnight events, reports has hx of asthma and uses albuterol as needed  Consultants/Specialty  Dr Slater  Psych      Disposition  Transfer to psych facility        Review of Systems   Constitutional: Negative for fever and malaise/fatigue.   HENT: Negative for congestion, ear discharge and nosebleeds.    Eyes: Negative for blurred vision, pain, discharge and redness.   Respiratory: Negative for cough, hemoptysis, sputum production, shortness of breath and stridor.    Cardiovascular: Negative for chest pain (chest wall improved), orthopnea and leg swelling.   Gastrointestinal: Negative for abdominal pain, blood in stool, diarrhea, melena, nausea and vomiting.   Genitourinary: Negative for dysuria, flank pain and hematuria.   Musculoskeletal: Negative for back pain, falls, joint pain and neck pain.   Skin: Negative.    Neurological: Negative for speech change, focal weakness, seizures, loss of consciousness, weakness and headaches.   Psychiatric/Behavioral: Positive for depression. Negative for hallucinations and memory loss. The patient is not nervous/anxious.    All other systems reviewed and are negative.     Physical Exam  Laboratory/Imaging   Hemodynamics  Temp (24hrs), Av °C (98.6 °F), Min:36.9 °C (98.5 °F), Max:37 °C (98.6 °F)   Temperature: 36.9 °C (98.5 °F)  Pulse  Av.3  Min: 46  Max: 133 Heart Rate (Monitored): (!) 59  NIBP: 112/60      Respiratory      Respiration: 14, Pulse Oximetry: 95 %     Work Of Breathing / Effort: Mild  RUL Breath Sounds: Clear, RML Breath Sounds: Clear, RLL Breath Sounds: Clear, JOSE RAMON Breath Sounds: Clear, LLL Breath Sounds: Clear    Fluids    Intake/Output Summary (Last 24 hours) at 18 1620  Last data filed at 18 0000   Gross per 24 hour   Intake             1090 ml    Output                0 ml   Net             1090 ml       Nutrition  Orders Placed This Encounter   Procedures   • DIET ORDER     Standing Status:   Standing     Number of Occurrences:   1     Order Specific Question:   Diet:     Answer:   Regular [1]     Physical Exam   Constitutional: He is oriented to person, place, and time. He appears well-developed and well-nourished.   HENT:   Head: Normocephalic and atraumatic.   Right Ear: External ear normal.   Mouth/Throat: No oropharyngeal exudate.   Eyes: Right eye exhibits no discharge. Left eye exhibits no discharge. No scleral icterus.   Neck: Neck supple. No JVD present. No tracheal deviation present.   Cardiovascular: Normal rate and regular rhythm.  Exam reveals no gallop and no friction rub.    No murmur heard.  Pulmonary/Chest: Effort normal and breath sounds normal. No respiratory distress. He has no wheezes. He exhibits tenderness (reproducing symptoms).   Abdominal: Soft. Bowel sounds are normal. He exhibits no distension. There is no tenderness. There is no rebound.   Musculoskeletal: He exhibits no edema or tenderness.   Neurological: He is alert and oriented to person, place, and time. No cranial nerve deficit. He exhibits normal muscle tone.   Skin: Skin is warm and dry. He is not diaphoretic. No cyanosis or erythema. Nails show no clubbing.   Psychiatric: Cognition and memory are normal. He exhibits a depressed mood.   Nursing note and vitals reviewed.      Recent Labs      01/24/18   0626   WBC  6.6   RBC  4.31*   HEMOGLOBIN  12.9*   HEMATOCRIT  39.1*   MCV  90.7   MCH  29.9   MCHC  33.0*   RDW  41.8   PLATELETCT  212   MPV  10.5     Recent Labs      01/24/18   0626  01/25/18   0540   SODIUM  See comment  139   POTASSIUM  See comment  4.1   CHLORIDE  See comment  108   CO2  23  24   GLUCOSE  92  92   BUN  7*  8   CREATININE  0.67  0.83   CALCIUM  9.1  9.5             Recent Labs      01/25/18   0540   TRIGLYCERIDE  106          Assessment/Plan     *  Overdose   Assessment & Plan    Legal hold   Evaluated by psychiatry legal hold extended and started on Zoloft        Acute respiratory failure with hypoxia (CMS-HCC)- (present on admission)   Assessment & Plan    Resolved          Mild intermittent asthma without complication   Assessment & Plan    Albuterol prn        Chest wall pain   Assessment & Plan    Musculoskeletal in etiology  Tylenol as needed            Altered mental state   Assessment & Plan    Likely toxic encephalopathy  resolved            Reviewed items::  Labs reviewed, Medications reviewed and Radiology images reviewed  Cheek catheter::  No Cheek  DVT prophylaxis - mechanical:  SCDs

## 2018-01-27 NOTE — CARE PLAN
Problem: Safety  Goal: Will remain free from injury  Patient has one on one sitter present at all times. Safe environment provided. Assessing behavior and possible injury to self. Bed brake on. Bed alarm on.     Problem: Pain Management  Goal: Pain level will decrease to patient's comfort goal  Patient's pain assessed. Patient denies any pain at this time.     Problem: Safety:  Goal: Will remain free from injury  Patient has one on one sitter present at all times. Safe environment provided. Assessing behavior and possible injury to self. Bed brake on. Bed alarm on.

## 2018-01-28 LAB
ALBUMIN SERPL BCP-MCNC: 3.8 G/DL (ref 3.2–4.9)
ALBUMIN/GLOB SERPL: 1.4 G/DL
ALP SERPL-CCNC: 76 U/L (ref 30–99)
ALT SERPL-CCNC: 24 U/L (ref 2–50)
ANION GAP SERPL CALC-SCNC: 5 MMOL/L (ref 0–11.9)
AST SERPL-CCNC: 17 U/L (ref 12–45)
BASOPHILS # BLD AUTO: 0.7 % (ref 0–1.8)
BASOPHILS # BLD: 0.04 K/UL (ref 0–0.12)
BILIRUB SERPL-MCNC: 0.5 MG/DL (ref 0.1–1.5)
BUN SERPL-MCNC: 10 MG/DL (ref 8–22)
CALCIUM SERPL-MCNC: 9.8 MG/DL (ref 8.5–10.5)
CHLORIDE SERPL-SCNC: 107 MMOL/L (ref 96–112)
CO2 SERPL-SCNC: 25 MMOL/L (ref 20–33)
CREAT SERPL-MCNC: 0.81 MG/DL (ref 0.5–1.4)
EOSINOPHIL # BLD AUTO: 0.09 K/UL (ref 0–0.51)
EOSINOPHIL NFR BLD: 1.6 % (ref 0–6.9)
ERYTHROCYTE [DISTWIDTH] IN BLOOD BY AUTOMATED COUNT: 41.1 FL (ref 35.9–50)
GLOBULIN SER CALC-MCNC: 2.8 G/DL (ref 1.9–3.5)
GLUCOSE SERPL-MCNC: 100 MG/DL (ref 65–99)
HCT VFR BLD AUTO: 43.1 % (ref 42–52)
HGB BLD-MCNC: 14.7 G/DL (ref 14–18)
IMM GRANULOCYTES # BLD AUTO: 0.03 K/UL (ref 0–0.11)
IMM GRANULOCYTES NFR BLD AUTO: 0.5 % (ref 0–0.9)
LYMPHOCYTES # BLD AUTO: 1.65 K/UL (ref 1–4.8)
LYMPHOCYTES NFR BLD: 29.6 % (ref 22–41)
MCH RBC QN AUTO: 30.6 PG (ref 27–33)
MCHC RBC AUTO-ENTMCNC: 34.1 G/DL (ref 33.7–35.3)
MCV RBC AUTO: 89.8 FL (ref 81.4–97.8)
MONOCYTES # BLD AUTO: 0.5 K/UL (ref 0–0.85)
MONOCYTES NFR BLD AUTO: 9 % (ref 0–13.4)
NEUTROPHILS # BLD AUTO: 3.27 K/UL (ref 1.82–7.42)
NEUTROPHILS NFR BLD: 58.6 % (ref 44–72)
NRBC # BLD AUTO: 0 K/UL
NRBC BLD-RTO: 0 /100 WBC
PLATELET # BLD AUTO: 242 K/UL (ref 164–446)
PMV BLD AUTO: 10.7 FL (ref 9–12.9)
POTASSIUM SERPL-SCNC: 4.4 MMOL/L (ref 3.6–5.5)
PROT SERPL-MCNC: 6.6 G/DL (ref 6–8.2)
RBC # BLD AUTO: 4.8 M/UL (ref 4.7–6.1)
SODIUM SERPL-SCNC: 137 MMOL/L (ref 135–145)
TSH SERPL DL<=0.005 MIU/L-ACNC: 0.69 UIU/ML (ref 0.38–5.33)
WBC # BLD AUTO: 5.6 K/UL (ref 4.8–10.8)

## 2018-01-28 PROCEDURE — 700102 HCHG RX REV CODE 250 W/ 637 OVERRIDE(OP): Performed by: PSYCHIATRY & NEUROLOGY

## 2018-01-28 PROCEDURE — 700111 HCHG RX REV CODE 636 W/ 250 OVERRIDE (IP): Performed by: INTERNAL MEDICINE

## 2018-01-28 PROCEDURE — 770001 HCHG ROOM/CARE - MED/SURG/GYN PRIV*

## 2018-01-28 PROCEDURE — 99232 SBSQ HOSP IP/OBS MODERATE 35: CPT | Performed by: FAMILY MEDICINE

## 2018-01-28 PROCEDURE — 36415 COLL VENOUS BLD VENIPUNCTURE: CPT

## 2018-01-28 PROCEDURE — 700102 HCHG RX REV CODE 250 W/ 637 OVERRIDE(OP): Performed by: INTERNAL MEDICINE

## 2018-01-28 PROCEDURE — 80053 COMPREHEN METABOLIC PANEL: CPT

## 2018-01-28 PROCEDURE — A9270 NON-COVERED ITEM OR SERVICE: HCPCS | Performed by: PSYCHIATRY & NEUROLOGY

## 2018-01-28 PROCEDURE — 700102 HCHG RX REV CODE 250 W/ 637 OVERRIDE(OP): Performed by: HOSPITALIST

## 2018-01-28 PROCEDURE — 84443 ASSAY THYROID STIM HORMONE: CPT

## 2018-01-28 PROCEDURE — 85025 COMPLETE CBC W/AUTO DIFF WBC: CPT

## 2018-01-28 PROCEDURE — A9270 NON-COVERED ITEM OR SERVICE: HCPCS | Performed by: INTERNAL MEDICINE

## 2018-01-28 PROCEDURE — A9270 NON-COVERED ITEM OR SERVICE: HCPCS | Performed by: HOSPITALIST

## 2018-01-28 RX ADMIN — STANDARDIZED SENNA CONCENTRATE AND DOCUSATE SODIUM 2 TABLET: 8.6; 5 TABLET, FILM COATED ORAL at 21:37

## 2018-01-28 RX ADMIN — ALBUTEROL SULFATE 2 PUFF: 90 AEROSOL, METERED RESPIRATORY (INHALATION) at 12:30

## 2018-01-28 RX ADMIN — STANDARDIZED SENNA CONCENTRATE AND DOCUSATE SODIUM 2 TABLET: 8.6; 5 TABLET, FILM COATED ORAL at 08:25

## 2018-01-28 RX ADMIN — ACETAMINOPHEN 650 MG: 325 TABLET, FILM COATED ORAL at 10:19

## 2018-01-28 RX ADMIN — ENOXAPARIN SODIUM 40 MG: 100 INJECTION SUBCUTANEOUS at 08:25

## 2018-01-28 RX ADMIN — NICOTINE 14 MG: 14 PATCH, EXTENDED RELEASE TRANSDERMAL at 06:08

## 2018-01-28 RX ADMIN — SERTRALINE 50 MG: 50 TABLET, FILM COATED ORAL at 08:25

## 2018-01-28 ASSESSMENT — ENCOUNTER SYMPTOMS
HALLUCINATIONS: 0
BLURRED VISION: 0
CHILLS: 0
WEAKNESS: 0
DIARRHEA: 0
WHEEZING: 0
COUGH: 1
NERVOUS/ANXIOUS: 0
HEARTBURN: 0
ABDOMINAL PAIN: 0
DIZZINESS: 0
NAUSEA: 0
VOMITING: 0
DEPRESSION: 1
FEVER: 0
SHORTNESS OF BREATH: 0
SORE THROAT: 0

## 2018-01-28 ASSESSMENT — PAIN SCALES - GENERAL
PAINLEVEL_OUTOF10: 0
PAINLEVEL_OUTOF10: 5
PAINLEVEL_OUTOF10: 0

## 2018-01-28 NOTE — PROGRESS NOTES
Recd report, assumed care. Pt A&O*4, denies pain. VSS. Assessment complete. No family at bedside. Legal hold, sitter at bedside. All needs met. Fall precautions in place, call light in reach, white board updated, hourly rounding in place. Will continue to monitor.

## 2018-01-28 NOTE — ASSESSMENT & PLAN NOTE
Patient states he's no longer suicidal.  Patient at this point remains a legal hold. The courts have extended his legal hold.

## 2018-01-28 NOTE — CARE PLAN
Problem: Safety  Goal: Will remain free from injury  Patient has one on one sitter present at all times. Safe environment provided. Assessing behavior and possible injury to self. Bed brake on. Bed alarm on.      Problem: Pain Management  Goal: Pain level will decrease to patient's comfort goal  Patient's pain assessed. Patient denies any pain at this time.

## 2018-01-28 NOTE — CARE PLAN
Problem: Safety  Goal: Will remain free from injury  Sitter at bedside.     Problem: Pain Management  Goal: Pain level will decrease to patient's comfort goal  Tylenol given as needed. Pain controlled with meds given.    Problem: Safety:  Goal: Will remain free from injury  Sitter at bedside.

## 2018-01-28 NOTE — PROGRESS NOTES
[facility]    Date of Service: 2018    Chief Complaint  27 y.o. male admitted 2018 with Intentional overdose aloc and respiratory failure Interval Problem Update      No overnight event        Consultants/Specialty  Dr Slater  Psych      Disposition  Transfer to psych facility        Review of Systems   Constitutional: Negative for chills and fever.   HENT: Negative for congestion, hearing loss and nosebleeds.    Eyes: Negative for discharge and redness.   Respiratory: Negative for cough, hemoptysis, sputum production and wheezing.    Cardiovascular: Negative for palpitations, orthopnea and leg swelling. Chest pain: chest wall improved.   Gastrointestinal: Negative.    Genitourinary: Negative for flank pain and hematuria.   Musculoskeletal: Negative for back pain, joint pain and neck pain.   Skin: Negative.    Neurological: Negative for speech change, focal weakness, seizures, loss of consciousness and headaches.   Psychiatric/Behavioral: Positive for depression. Negative for hallucinations. The patient is not nervous/anxious.    All other systems reviewed and are negative.     Physical Exam  Laboratory/Imaging   Hemodynamics  Temp (24hrs), Av °C (98.6 °F), Min:36.9 °C (98.5 °F), Max:37 °C (98.6 °F)   Temperature: 36.9 °C (98.5 °F)  Pulse  Av.3  Min: 46  Max: 133 Heart Rate (Monitored): 81  NIBP: 115/67      Respiratory      Respiration: 14, Pulse Oximetry: 96 %     Work Of Breathing / Effort: Mild  RUL Breath Sounds: Clear, RML Breath Sounds: Clear, RLL Breath Sounds: Clear, JOSE RAMON Breath Sounds: Clear, LLL Breath Sounds: Clear    Fluids  No intake or output data in the 24 hours ending 18 1820    Nutrition  Orders Placed This Encounter   Procedures   • DIET ORDER     Standing Status:   Standing     Number of Occurrences:   1     Order Specific Question:   Diet:     Answer:   Regular [1]     Physical Exam   Constitutional: He is oriented to person, place, and time. He  appears well-developed and well-nourished.   HENT:   Head: Normocephalic.   Mouth/Throat: Oropharynx is clear and moist. No oropharyngeal exudate.   Eyes: Conjunctivae are normal. Right eye exhibits no discharge. Left eye exhibits no discharge. No scleral icterus.   Neck: Neck supple. No JVD present. No tracheal deviation present.   Cardiovascular: Normal rate and regular rhythm.  Exam reveals no gallop and no friction rub.    No murmur heard.  Pulmonary/Chest: Effort normal and breath sounds normal. No stridor. No respiratory distress. He has no wheezes. He exhibits tenderness (reproducing symptoms, improved).   Abdominal: Soft. Bowel sounds are normal. He exhibits no distension. There is no tenderness. There is no rebound and no guarding.   Musculoskeletal: He exhibits no edema or tenderness.   Neurological: He is alert and oriented to person, place, and time. No cranial nerve deficit. He exhibits normal muscle tone.   Skin: Skin is warm and dry. No rash noted. He is not diaphoretic. No cyanosis or erythema. Nails show no clubbing.   Psychiatric: His speech is normal. Judgment normal. Cognition and memory are normal.   Nursing note and vitals reviewed.          Recent Labs      01/25/18   0540   SODIUM  139   POTASSIUM  4.1   CHLORIDE  108   CO2  24   GLUCOSE  92   BUN  8   CREATININE  0.83   CALCIUM  9.5             Recent Labs      01/25/18   0540   TRIGLYCERIDE  106          Assessment/Plan     * Overdose   Assessment & Plan      Evaluated by psychiatry legal hold extended  started on Zoloft        Acute respiratory failure with hypoxia (CMS-HCC)- (present on admission)   Assessment & Plan    Resolved          Mild intermittent asthma without complication   Assessment & Plan    Albuterol prn        Chest wall pain   Assessment & Plan    Musculoskeletal in etiology  improved  Tylenol as needed            Altered mental state   Assessment & Plan    Likely toxic encephalopathy  resolved        await inpt psych  facility    Reviewed items::  Labs reviewed, Medications reviewed and Radiology images reviewed  Cheek catheter::  No Cheek  DVT prophylaxis - mechanical:  SCDs

## 2018-01-28 NOTE — PROGRESS NOTES
Pt refused bed alarm despite education.  Treaded socks on, call light within reach, hourly rounding, 1:1 sitter.

## 2018-01-28 NOTE — PROGRESS NOTES
[facility] Hospitalist Progress Note    Date of Service: 2018    Chief Complaint  27 y.o. male admitted 2018 with Overdose, Suicidal, Respiratory failure.    Interval Problem Update  Suicide - denies current thoughts  Resp failure - intubated , extubated     Consultants/Specialty  Psychiatry    Disposition  Legal hold        Review of Systems   Constitutional: Negative for chills, fever and malaise/fatigue.   HENT: Negative for hearing loss and sore throat.    Eyes: Negative for blurred vision.   Respiratory: Positive for cough. Negative for shortness of breath and wheezing.    Cardiovascular: Negative for chest pain and leg swelling.   Gastrointestinal: Negative for abdominal pain, diarrhea, heartburn, nausea and vomiting.   Genitourinary: Negative for dysuria.   Neurological: Negative for dizziness and weakness.   Psychiatric/Behavioral: Positive for depression. Negative for hallucinations and suicidal ideas. The patient is not nervous/anxious.       Physical Exam  Laboratory/Imaging   Hemodynamics  Temp (24hrs), Av °C (98.6 °F), Min:36.8 °C (98.2 °F), Max:37.2 °C (98.9 °F)   Temperature: 37.1 °C (98.7 °F)  Pulse  Av.9  Min: 46  Max: 133 Heart Rate (Monitored): 80  Blood Pressure: 106/65, NIBP: 116/68      Respiratory      Respiration: 18, Pulse Oximetry: 98 %     Work Of Breathing / Effort: Mild  RUL Breath Sounds: Clear, RML Breath Sounds: Clear, RLL Breath Sounds: Clear, JOSE RAMON Breath Sounds: Clear, LLL Breath Sounds: Clear    Fluids    Intake/Output Summary (Last 24 hours) at 18 1137  Last data filed at 18 0600   Gross per 24 hour   Intake              550 ml   Output                0 ml   Net              550 ml       Nutrition  Orders Placed This Encounter   Procedures   • DIET ORDER     Standing Status:   Standing     Number of Occurrences:   1     Order Specific Question:   Diet:     Answer:   Regular [1]     Physical Exam    Recent Labs      18   0746   WBC  5.6   RBC   4.80   HEMOGLOBIN  14.7   HEMATOCRIT  43.1   MCV  89.8   MCH  30.6   MCHC  34.1   RDW  41.1   PLATELETCT  242   MPV  10.7     Recent Labs      01/28/18   0746   SODIUM  137   POTASSIUM  4.4   CHLORIDE  107   CO2  25   GLUCOSE  100*   BUN  10   CREATININE  0.81   CALCIUM  9.8                      Assessment/Plan     * Overdose- (present on admission)   Assessment & Plan    Legal Hold   Zoloft        Acute respiratory failure with hypoxia (CMS-HCC)- (present on admission)   Assessment & Plan    Encourage IS          Suicide attempt- (present on admission)   Assessment & Plan    Psychiatry following        Mild intermittent asthma without complication- (present on admission)   Assessment & Plan    RT protocol        Chest wall pain- (present on admission)   Assessment & Plan    Pain control              Quality-Core Measures

## 2018-01-28 NOTE — PROGRESS NOTES
Report received from Yohana NOBLES.  Assumed pt care when he arrived to the unit.  A&O 4, VSS, 1:1 sitter at bedside for legal hold.  No equipment necessary.  Ambulates by himself in room and to bathroom.  Denies any pain at this time.

## 2018-01-29 PROCEDURE — 700102 HCHG RX REV CODE 250 W/ 637 OVERRIDE(OP): Performed by: INTERNAL MEDICINE

## 2018-01-29 PROCEDURE — 700102 HCHG RX REV CODE 250 W/ 637 OVERRIDE(OP): Performed by: PSYCHIATRY & NEUROLOGY

## 2018-01-29 PROCEDURE — A9270 NON-COVERED ITEM OR SERVICE: HCPCS | Performed by: PSYCHIATRY & NEUROLOGY

## 2018-01-29 PROCEDURE — A9270 NON-COVERED ITEM OR SERVICE: HCPCS | Performed by: INTERNAL MEDICINE

## 2018-01-29 PROCEDURE — 99232 SBSQ HOSP IP/OBS MODERATE 35: CPT | Performed by: FAMILY MEDICINE

## 2018-01-29 PROCEDURE — 700111 HCHG RX REV CODE 636 W/ 250 OVERRIDE (IP): Performed by: INTERNAL MEDICINE

## 2018-01-29 PROCEDURE — 770001 HCHG ROOM/CARE - MED/SURG/GYN PRIV*

## 2018-01-29 RX ORDER — SERTRALINE HYDROCHLORIDE 100 MG/1
100 TABLET, FILM COATED ORAL DAILY
Status: DISCONTINUED | OUTPATIENT
Start: 2018-01-30 | End: 2018-02-02 | Stop reason: HOSPADM

## 2018-01-29 RX ADMIN — STANDARDIZED SENNA CONCENTRATE AND DOCUSATE SODIUM 2 TABLET: 8.6; 5 TABLET, FILM COATED ORAL at 08:51

## 2018-01-29 RX ADMIN — ACETAMINOPHEN 650 MG: 325 TABLET, FILM COATED ORAL at 08:51

## 2018-01-29 RX ADMIN — STANDARDIZED SENNA CONCENTRATE AND DOCUSATE SODIUM 2 TABLET: 8.6; 5 TABLET, FILM COATED ORAL at 21:24

## 2018-01-29 RX ADMIN — ENOXAPARIN SODIUM 40 MG: 100 INJECTION SUBCUTANEOUS at 08:51

## 2018-01-29 RX ADMIN — NICOTINE 14 MG: 14 PATCH, EXTENDED RELEASE TRANSDERMAL at 05:35

## 2018-01-29 RX ADMIN — SERTRALINE 50 MG: 50 TABLET, FILM COATED ORAL at 08:51

## 2018-01-29 ASSESSMENT — PAIN SCALES - GENERAL
PAINLEVEL_OUTOF10: 0
PAINLEVEL_OUTOF10: 2
PAINLEVEL_OUTOF10: 0

## 2018-01-29 ASSESSMENT — ENCOUNTER SYMPTOMS
BLURRED VISION: 0
WEAKNESS: 0
WHEEZING: 0
VOMITING: 0
DEPRESSION: 1
HALLUCINATIONS: 0
DIARRHEA: 0
NERVOUS/ANXIOUS: 0
CHILLS: 0
DIZZINESS: 0
HEARTBURN: 0
COUGH: 1
SHORTNESS OF BREATH: 0
FEVER: 0
SORE THROAT: 0
ABDOMINAL PAIN: 0
NAUSEA: 0

## 2018-01-29 NOTE — DIETARY
Nutrition Services: Update: Weekly Rescreen    26 yo male diagnosed with altered mental state, respiratory failure, and suicide attempt.  Day 7 admit trigger.  Per ADLs, pt appeared to have variable PO intake.  I spoke with pt at bedside to follow-up with PO intake.  Pt reports he has a good appetite and consumes 100% of his meals and snacks.    GI: Last BM 1/28  Wt: 77.6 kg via bed scale on 1/26  BMI: 21.96 - WNL    PLAN/RECOMMEND -   1) Nutrition rep to see patient daily for meal and snack preferences.  2) Encourage PO  3) Weekly weights to monitor fluid and nutrition status    RD available as needed

## 2018-01-29 NOTE — PROGRESS NOTES
Pt A&Ox4, denies any numbness and tingling, pain is 2/10 (pain medication given).    Call light within reach, pt educated on importance of using his call light when he needs assistance, mpt verbalized understanding.    Sitter at bedside.

## 2018-01-29 NOTE — PROGRESS NOTES
[facility] Hospitalist Progress Note    Date of Service: 2018    Chief Complaint  27 y.o. male admitted 2018 with Overdose, Suicidal, Respiratory failure.    Interval Problem Update  Suicide - denies current thoughts  Resp failure - intubated , extubated , cough clearing with IS    Consultants/Specialty  Psychiatry    Disposition  Legal hold        Review of Systems   Constitutional: Negative for chills, fever and malaise/fatigue.   HENT: Negative for hearing loss and sore throat.    Eyes: Negative for blurred vision.   Respiratory: Positive for cough. Negative for shortness of breath and wheezing.    Cardiovascular: Negative for chest pain and leg swelling.   Gastrointestinal: Negative for abdominal pain, diarrhea, heartburn, nausea and vomiting.   Genitourinary: Negative for dysuria.   Neurological: Negative for dizziness and weakness.   Psychiatric/Behavioral: Positive for depression. Negative for hallucinations and suicidal ideas. The patient is not nervous/anxious.       Physical Exam  Laboratory/Imaging   Hemodynamics  Temp (24hrs), Av °C (98.6 °F), Min:36.7 °C (98 °F), Max:37.3 °C (99.1 °F)   Temperature: 36.7 °C (98 °F)  Pulse  Av.5  Min: 46  Max: 133    Blood Pressure: 118/64      Respiratory      Respiration: 17, Pulse Oximetry: 98 %     Work Of Breathing / Effort: Mild  RUL Breath Sounds: Clear, RML Breath Sounds: Clear, RLL Breath Sounds: Clear, JOSE RAMNO Breath Sounds: Clear, LLL Breath Sounds: Clear    Fluids    Intake/Output Summary (Last 24 hours) at 18 1002  Last data filed at 18 0841   Gross per 24 hour   Intake              540 ml   Output                0 ml   Net              540 ml       Nutrition  Orders Placed This Encounter   Procedures   • DIET ORDER     Standing Status:   Standing     Number of Occurrences:   1     Order Specific Question:   Diet:     Answer:   Regular [1]     Physical Exam      Recent Labs      18   0746   WBC  5.6   RBC  4.80    HEMOGLOBIN  14.7   HEMATOCRIT  43.1   MCV  89.8   MCH  30.6   MCHC  34.1   RDW  41.1   PLATELETCT  242   MPV  10.7     Recent Labs      01/28/18   0746   SODIUM  137   POTASSIUM  4.4   CHLORIDE  107   CO2  25   GLUCOSE  100*   BUN  10   CREATININE  0.81   CALCIUM  9.8                      Assessment/Plan     * Overdose- (present on admission)   Assessment & Plan    Legal Hold   Zoloft        Acute respiratory failure with hypoxia (CMS-HCC)- (present on admission)   Assessment & Plan    Encourage IS          Suicide attempt- (present on admission)   Assessment & Plan    Psychiatry following        Mild intermittent asthma without complication- (present on admission)   Assessment & Plan    RT protocol        Chest wall pain- (present on admission)   Assessment & Plan    Pain control              Quality-Core Measures

## 2018-01-29 NOTE — CARE PLAN
Problem: Safety  Goal: Will remain free from injury    Intervention: Collaborate with Interdisciplinary Team for safe transfer and mobilization techniques  Patient educated about safety and fall precaution. Educated about call light use. 1:1 sitter at the bedside. No s/s of distress noted from pt. Hourly rounding in practice.      Problem: Knowledge Deficit  Goal: Knowledge of disease process/condition, treatment plan, diagnostic tests, and medications will improve    Intervention: Assess knowledge level of disease process/condition, treatment plan, diagnostic tests, and medications  Reviewing plan of care, activities, and medication with patient.  Encouraging patient to ask questions and participate in plan of care.  Providing answers to all questions.  Continuing with current plan of care.  Hourly rounding in practice.      Problem: Safety:  Goal: Will remain free from injury    Intervention: Collaborate with Interdisciplinary Team for safe transfer and mobilization techniques  Patient educated about safety and fall precaution. Educated about call light use. 1:1 sitter at the bedside. No s/s of distress noted from pt. Hourly rounding in practice.

## 2018-01-30 PROBLEM — Z72.0 TOBACCO ABUSE DISORDER: Status: ACTIVE | Noted: 2018-01-30

## 2018-01-30 PROCEDURE — 700111 HCHG RX REV CODE 636 W/ 250 OVERRIDE (IP): Performed by: INTERNAL MEDICINE

## 2018-01-30 PROCEDURE — 99232 SBSQ HOSP IP/OBS MODERATE 35: CPT | Performed by: HOSPITALIST

## 2018-01-30 PROCEDURE — 700102 HCHG RX REV CODE 250 W/ 637 OVERRIDE(OP): Performed by: PSYCHIATRY & NEUROLOGY

## 2018-01-30 PROCEDURE — 700102 HCHG RX REV CODE 250 W/ 637 OVERRIDE(OP): Performed by: INTERNAL MEDICINE

## 2018-01-30 PROCEDURE — A9270 NON-COVERED ITEM OR SERVICE: HCPCS | Performed by: INTERNAL MEDICINE

## 2018-01-30 PROCEDURE — A9270 NON-COVERED ITEM OR SERVICE: HCPCS | Performed by: PSYCHIATRY & NEUROLOGY

## 2018-01-30 PROCEDURE — 770001 HCHG ROOM/CARE - MED/SURG/GYN PRIV*

## 2018-01-30 RX ADMIN — STANDARDIZED SENNA CONCENTRATE AND DOCUSATE SODIUM 2 TABLET: 8.6; 5 TABLET, FILM COATED ORAL at 19:48

## 2018-01-30 RX ADMIN — NICOTINE 14 MG: 14 PATCH, EXTENDED RELEASE TRANSDERMAL at 05:41

## 2018-01-30 RX ADMIN — ENOXAPARIN SODIUM 40 MG: 100 INJECTION SUBCUTANEOUS at 10:36

## 2018-01-30 RX ADMIN — STANDARDIZED SENNA CONCENTRATE AND DOCUSATE SODIUM 2 TABLET: 8.6; 5 TABLET, FILM COATED ORAL at 10:36

## 2018-01-30 RX ADMIN — SERTRALINE 100 MG: 100 TABLET, FILM COATED ORAL at 10:36

## 2018-01-30 ASSESSMENT — ENCOUNTER SYMPTOMS
VOMITING: 0
HEMOPTYSIS: 0
HEADACHES: 0
DIAPHORESIS: 0
BRUISES/BLEEDS EASILY: 0
BLOOD IN STOOL: 0
PALPITATIONS: 0
CARDIOVASCULAR NEGATIVE: 1
DOUBLE VISION: 0
EYES NEGATIVE: 1
FOCAL WEAKNESS: 0
NAUSEA: 0
CHILLS: 0
DIZZINESS: 0
NERVOUS/ANXIOUS: 0
ABDOMINAL PAIN: 0
COUGH: 0
WHEEZING: 0
CONSTITUTIONAL NEGATIVE: 1
SEIZURES: 0
MUSCULOSKELETAL NEGATIVE: 1
DIARRHEA: 0
RESPIRATORY NEGATIVE: 1
GASTROINTESTINAL NEGATIVE: 1
CONSTIPATION: 0
HEARTBURN: 0
DEPRESSION: 0
FEVER: 0
NEUROLOGICAL NEGATIVE: 1
LOSS OF CONSCIOUSNESS: 0

## 2018-01-30 ASSESSMENT — PAIN SCALES - GENERAL
PAINLEVEL_OUTOF10: 0

## 2018-01-30 ASSESSMENT — LIFESTYLE VARIABLES: SUBSTANCE_ABUSE: 0

## 2018-01-30 NOTE — PSYCHIATRY
"PSYCHOLOGICAL CONSULTATION/FOLLOW-UP:  Reason for admission: Altered mental state  Respiratory failure requiring intubation (CMS-[facility])  Suicide attempt  Supervising Physician: Suzan Galdamez MD    Length of Visit: 60min    Legal status: Legal Status: Involuntary    Chief Complaint: \"i'm feeling a little ashamed.\"     HPI: Igor Kapadia is a 27 y.o. male who presented to the hospital a week ago after attempting suicide overdosing on alcohol and cutting his wrists. This writer met with the patient at the request of the team psychiatrist.     The patient reported a \"little ashamed\" mood and displayed a euthymic affect. He was able to laugh and cry appropriately. The patient explained that prior to his suicide attempt, his anxiety as well as suicidal ideation had been \"building up\" after his girlfriend of two years ended the relationship. He spoke at length about the relationship including the financial debt he has incurred as a result (e.g. Patient took out a 10,000 dollar loan to help support her.) He also briefly discussed family stressors, including the perceived lack of emotional support from his father, and his (the patient's) \"non-confrontational\" communication style. He stated that these stressors and his avoidance of conflict contributed to his suicide attempt. He stated that at the time of his attempt he felt \"numb\" and \"cold\" about his emotions and his decision to die.  He also reported that he tried to call friends over the phone, mostly \"to say good bye\" and that he tried to time his attempt so that he would be \"blacked out\" before his friends could \"talk me out of it.\"     The patient denied current suicidal thoughts and stated that he has not experienced them while in the hospital.  When asked how he felt about being alive, he stated, \"I'm glad I am.\" The patient was future oriented towards working, expanding his social [place], trying to buy a house and nice car, traveling, becoming a tattoo " "artist, and spending time with his family. He also states that he wants to enjoy his vision as he recently had laser eye surgery. Pt admits feeling \"guilty\" regarding the effects of his actions on his family, stating \"I'm more afraid of hurting others.\"     Patient reported decreased rumination and sadness. He was able to discuss his painful emotions and ways he currently socorro with them including listening to comedy and coloring. He reported that last night was \"rough\" for him as he could not get his ex-girlfriend off his mind. He began to feel sad and angry and coped with these emotions by listening to music.    If discharged home, pt's goals include improving communication with his family. If he is unable to communicate with them, he will reach out to the friends he identified in his coping plan. He also stated that he would be able to live for free with his friend if he needs a place to stay that is less stressful than his home. He does have concerns regarding outpatient therapy as he is unsure how he will be able to pay for it. He also thinks that he does better with scheduled therapy in a more structured setting vs. having to plan it himself.     Psychiatric Examination:  Vitals: Blood pressure 109/59, pulse 64, temperature 36.9 °C (98.4 °F), resp. rate 16, height 1.88 m (6' 2.02\"), weight 77.6 kg (171 lb 1.2 oz), SpO2 97 %.  Musculoskeletal: normal psychomotor activity, no tics or unusual mannerisms noted  Appearance and Eye Contact: appropriate dress and grooming. Behavior is calm, cooperative,  appropriate eye contact  Attention/Alertness: Alert  Thought Process: Linear, Logical and Goal Directed    Thought Content: Not significant for psychotic processes  Speech: Clear with normal rate and rhythm  Mood: \"a little ashamed\"            Affect: euthymic         SI/HI: Denies    Memory: Recent and remote memory appear intact    Orientation: alert, oriented to person, place and time  Insight into symptoms: " Good  Judgement into symptoms:Fair    ASSESSMENT: The patient will be seen by the team psychiatrist (patient's primary provider on the psychiatric team) tomorrow to reevaluate his legal hold status.     DSM5 Diagnostic Considerations:   Adjustment disorder with depressed mood    Rule out:  Major Depressive Disorder   Unspecified Anxiety Disorder      PLAN:  Legal status: Involuntary.   Anticipate F/U within 48 hours.   Records reviewed: yes  Discussed patient with other provider: yes  Psychiatry will continue to follow while the patient is in the hospital   Thank you for the consult.    Eloisa Douglas, PhD

## 2018-01-30 NOTE — PSYCHIATRY
"    [facility]  Crisis/Safety Plan    Name:  Igor Kapadia  MRN:  [id number]  Date:  2018    Warning signs that a crisis may be developing for me or I may be at risk:  1) Feeling exhausted despite resting  2) losing interest in what I normally enjoy  3) thinking about \"what ifs\" and \"I should've\" frequently    Coping strategies I can use on my own (relaxation, physical activity, etc):  1) Talk to my friends  2) Draw/color/ find a creative activity  3) Go for a workout    Ways I can make my environment safe:  1) Remove all sharp and hazardous objects  2) Make sure I have a way to communicate with people  3) Make my environment more calming and welcoming    Things I want to tell myself when I feel a crisis developin) Go talk to someone  2) Go find something fun to do  3) Go somewhere where I'm not alone    People I can contact for support or distraction (and their phone numbers):  1) My co-worker Joseph: [phone]  2) My friend Karin: [phone]  3) My friend Andrea: [phone]    If I’m not able to reach my support people, or the above strategies don’t help, I can contact the following professionals, agencies, or hotlines:  1) Crisis Call Center ():  [phone] -OR- [phone]  2) Crisis Text Line ():  Text START to 130545  3) Call 941  4) Go to a [facility]    Eloisa Douglas, Ph.D.    "

## 2018-01-30 NOTE — CARE PLAN
Problem: Communication  Goal: The ability to communicate needs accurately and effectively will improve  Outcome: PROGRESSING AS EXPECTED  Patient in good spirits. Patient able to communicate needs effectively and uses call light appropriately. RN hourly rounding in place.     Problem: Safety  Goal: Will remain free from falls  Outcome: PROGRESSING AS EXPECTED  Fall precautions implemented. Treaded socks and 1:1 sitter at bedside. Patient remains free from falls.

## 2018-01-30 NOTE — PROGRESS NOTES
[facility]ist Progress Note    Date of Service: 2018    Chief Complaint  27 y.o. male admitted 2018 with suicidal attempt.    Interval Problem Update  In the past 24 hours patient has not had any further suicidal ideation. The patient at this point regrets what he has done. He is formulating at this point how to get his life back together after he is discharged. Psychiatry at this point has not cleared him legally off the legal hold. Patient did have visitors with his mom and dad today patient also at this point is able to use his cell phone and is actually taxing his friends. Per psychiatry's not to have any contact with his previous girlfriend who is the cause of his initial suicidal attempt.    Consultants/Specialty  Psychiatry    Disposition  To be determined        Review of Systems   Constitutional: Negative.  Negative for chills, diaphoresis and fever.   HENT: Negative.    Eyes: Negative.  Negative for double vision.   Respiratory: Negative.  Negative for cough, hemoptysis and wheezing.    Cardiovascular: Negative.  Negative for chest pain, palpitations and leg swelling.   Gastrointestinal: Negative.  Negative for abdominal pain, blood in stool, constipation, diarrhea, heartburn, nausea and vomiting.   Genitourinary: Negative.  Negative for frequency, hematuria and urgency.   Musculoskeletal: Negative.  Negative for joint pain.   Skin: Negative.  Negative for itching and rash.   Neurological: Negative.  Negative for dizziness, focal weakness, seizures, loss of consciousness and headaches.   Endo/Heme/Allergies: Negative.  Does not bruise/bleed easily.   Psychiatric/Behavioral: Negative for depression, substance abuse and suicidal ideas. The patient is not nervous/anxious.       Physical Exam  Laboratory/Imaging   Hemodynamics  Temp (24hrs), Av °C (98.6 °F), Min:36.8 °C (98.3 °F), Max:37.1 °C (98.8 °F)   Temperature: 36.9 °C (98.4 °F)  Pulse  Av  Min: 46  Max: 133    Blood Pressure:  109/59      Respiratory      Respiration: 16, Pulse Oximetry: 97 %     Work Of Breathing / Effort: Mild  RUL Breath Sounds: Clear, RML Breath Sounds: Clear, RLL Breath Sounds: Clear, JOSE RAMON Breath Sounds: Clear, LLL Breath Sounds: Clear    Fluids    Intake/Output Summary (Last 24 hours) at 01/30/18 1343  Last data filed at 01/30/18 1040   Gross per 24 hour   Intake              410 ml   Output                0 ml   Net              410 ml       Nutrition  Orders Placed This Encounter   Procedures   • DIET ORDER     Standing Status:   Standing     Number of Occurrences:   1     Order Specific Question:   Diet:     Answer:   Regular [1]     Physical Exam   Constitutional: He is oriented to person, place, and time. He appears well-developed and well-nourished.   HENT:   Head: Normocephalic and atraumatic.   Right Ear: External ear normal.   Left Ear: External ear normal.   Nose: Nose normal.   Mouth/Throat: Oropharynx is clear and moist.   Eyes: Conjunctivae and EOM are normal. Pupils are equal, round, and reactive to light.   Neck: Normal range of motion. Neck supple. No JVD present. No thyromegaly present.   Cardiovascular: Normal rate and regular rhythm.    No murmur heard.  Pulmonary/Chest: He has no wheezes. He has no rales. He exhibits no tenderness.   Abdominal: He exhibits no distension and no mass. There is no tenderness. There is no rebound and no guarding.   Musculoskeletal: Normal range of motion. He exhibits no edema or tenderness.   Lymphadenopathy:     He has no cervical adenopathy.   Neurological: He is alert and oriented to person, place, and time. He has normal reflexes. No cranial nerve deficit.   Skin: Skin is warm and dry. No rash noted. No erythema.   Psychiatric: He has a normal mood and affect. His speech is normal and behavior is normal. Cognition and memory are normal. He expresses no suicidal ideation. He expresses no suicidal plans and no homicidal plans.   Nursing note and vitals reviewed.       Recent Labs      01/28/18   0746   WBC  5.6   RBC  4.80   HEMOGLOBIN  14.7   HEMATOCRIT  43.1   MCV  89.8   MCH  30.6   MCHC  34.1   RDW  41.1   PLATELETCT  242   MPV  10.7     Recent Labs      01/28/18   0746   SODIUM  137   POTASSIUM  4.4   CHLORIDE  107   CO2  25   GLUCOSE  100*   BUN  10   CREATININE  0.81   CALCIUM  9.8                      Assessment/Plan     * Overdose- (present on admission)   Assessment & Plan    Overdose was an attempt to commit suicide.  Patient did have a suicide note.  Patient took multiple medications including alcohol.  Patient's initial cause for suicidal ideation was disagreement with his girlfriend.        Acute respiratory failure with hypoxia (CMS-HCC)- (present on admission)   Assessment & Plan    Resolved continue with oxygen as needed          Suicide attempt- (present on admission)   Assessment & Plan    For now remains in a legal hold and psychiatry continues to follow have not cleared him off the legal hold as of yet.        Mild intermittent asthma without complication- (present on admission)   Assessment & Plan    Patient does not have any asthma exacerbation at this point continue with respiratory therapy protocol        Chest wall pain- (present on admission)   Assessment & Plan    Currently resolved            Tobacco abuse disorder- (present on admission)   Assessment & Plan    -nicotine replacement protocol and cessation education provided for more than 10 minutes, discussed options of nicotine patch, acupuncture, medical treatment with wellbutrin and chantix. Discussed other options. Code 33533            Reviewed items::  EKG reviewed, Labs reviewed, Medications reviewed and Radiology images reviewed  Cheek catheter::  No Cheek  DVT prophylaxis pharmacological::  Enoxaparin (Lovenox)  Ulcer Prophylaxis::  Not indicated

## 2018-01-30 NOTE — DISCHARGE PLANNING
Medical Social Work    Referral:  Rounds    Intervention:  Pt discussed in rounds    Plan:  SW will remain available for dc planning

## 2018-01-30 NOTE — PSYCHIATRY
"PSYCHIATRIC FOLLOW UP:    Reason for Admission: Altered mental state, Respiratory failure requiring intubation, Suicide attempt  Legal hold status:  +    \"better\", thinking he wants to go home. Refers to the TV show, HDTV, and it \"inspires\" him to become independent, get his own place and a job but he doesn't have a clear idea how. He also doesn't have any plans for how he might deal with frustrations or why he is no longer SI. Parents have visited. No contact with GF. Sleeping on and off but feels rested.     Psychiatric Examination: observed phenomenon:  Vitals:Blood pressure 125/74, pulse 68, temperature 37.1 °C (98.8 °F), resp. rate 17, height 1.88 m (6' 2.02\"), weight 77.6 kg (171 lb 1.2 oz), SpO2 96 %.    Musculoskeletal(abnormal movements, gait, etc):none noted. Gait not observed.  Appearance:clean, normal habitus, cooperative  Thoughts:linear, no psychosis  Speech:wnl  Mood:\"better\"  Affect: euthymic  SI/HI:   denies  Attention/Alertness: intact    Memory: intact  Orientation:x 4  Fund of Knowledge: not tested     Insight/Judgement into symptoms: fair         Assessment:(acuity level)  Adjustment Disorder with depressed and anxious mood: improved  R/O Depressive Disorder Unsepc: pt says he has never been particularly happy but isn't sure if he would say he is depressed, may be related to family dynamics and self esteem issues.      Anxiety Disorder Unsepc: has had panic attacks, may be related to family dynamics and self esteem issues: unchanged    Alcohol Use Disorder: may be mild          Plan:if his improvement remains consistent will release from legal hold. Increase zoloft to 100 mg  legal hold:extended for now  Anticipated F/U: within 24 hours.    Discussed with other provider:Dr Douglas, psychology who will reassess pt as well  Will follow            "

## 2018-01-30 NOTE — PROGRESS NOTES
Patient resting quietly in bed, no S/S of distress, AA&Ox4. Denies SOB, chest pain, dizziness. 1:1 sitter at bedside, call light within reach,  pt calls appropriately and does not get out of bed. Bed in lowest position, bed locked, RN and CNA numbers provided, no further needs at this time. No changes from EPIC. Hourly rounding in place.

## 2018-01-30 NOTE — PROGRESS NOTES
Pt A&Ox4, denies any numbness/tingling/pain.    Call light within reach, pt educated on importance of using the call light when he needs assistance, npt verbalized understanding.    Sitter at bedside.

## 2018-01-30 NOTE — ASSESSMENT & PLAN NOTE
-nicotine replacement protocol and cessation education provided for more than 10 minutes, discussed options of nicotine patch, acupuncture, medical treatment with wellbutrin and chantix. Discussed other options. Code 03990

## 2018-01-30 NOTE — NON-PROVIDER
"PSYCHOLOGICAL FOLLOW-UP:  Reason for admission: Altered mental state  Respiratory failure requiring intubation (CMS-HCC)  Suicide attempt  Altered mental state  Respiratory failure requiring intubation (CMS-HCC)  Suicide attempt  Supervising Physician: Suzan Galdamez MD    Length of Visit: 60 minutes    Legal status: Legal Status: Involuntary    Chief Complaint: Suicidal ideation    HPI: Igor Kapadia is a 27 y.o. male who presented to the hospital a week ago for suicidal attempt by alcohol intoxication and cutting his wrists. On reflecting about his presentation, pt states that he is \"a little ashamed.\" He reports that his anxiety as well as suicidal ideation had been building up over the week prior to his presentation. He has had suicidal thoughts before, but it was always in a \"sarcastic\" tone and that it would pass; this episode was different in that it was compounded by acute stressors in his life. At that point, he states that he felt \"numb\" and \"cold\" about his emotions and suicide attempt. Pt discusses relationship with a girl that recently ended. He states that he put a lot of time and effort, including financial resources, into the relationship, and by the way it ended, he was left having to deal with the aftermath on his own. He also briefly discusses family stressors, including the lack of emotional support from his father. Pt states that he is non-confrontational, and that when there are problems in the family that need to be discussed, they tend to just shrug it off. Prior to his presentation, pt reports that he tried to call friends over the phone, mostly \"to say good bye.\" He states that he timed it close to blacking out so that he won't get talked out of it.    Pt denies any current suicidal thoughts since his inpatient stay. However, he does admit that last night was \"rough\" for him as he could not get his ex-girlfriend off his mind. He states that he began to feel sad, then angry, and " "was cycling between these negative emotions. In order to deal with it, pt states that he listened to music and was able to sleep after some time. When asked about being alive, pt states \"I'm glad I am.\" He reports that he has a lot of things to look forward to in his futre, such as buying a house and a nice car, traveling, becoming a , and spending time with his family. He also states that he wants to enjoy his vision as he recently had laser eye surgery. Pt admits being \"guilty\" regarding the effects of his actions on his family, stating \"I'm more afraid of hurting others.\" Pt admits that he still gets sad, but he has been ruminating less so. He is able to acknowledge if he is having negative emotions, and he tries to do other things to help him relax, such as listening to comedy and coloring.    If discharged home, pt's goals include improving communication with his family. If he is unable to communicate with them, he will reach out to someone else. He has friends who he can contact. He also has a co-worker who has offered a place to stay if he needs somewhere to go. He is also going to check in at work to see if he still has a job; if not, he states that he will be able to find a job. He also wants to expand his social [place]. He does have concern regarding outpatient therapy as he is unsure how he will be able to pay for it. He also thinks that he does better with scheduled therapy in a more structured setting vs. having to plan it himself.    Psychiatric Examination:  Vitals: Blood pressure 109/59, pulse 64, temperature 36.9 °C (98.4 °F), resp. rate 16, height 1.88 m (6' 2.02\"), weight 77.6 kg (171 lb 1.2 oz), SpO2 97 %.  Musculoskeletal: Normal psychomotor activity, no tics or unusual mannerisms noted  Appearance and Eye Contact: WDWN, appropriate dress and grooming. Behavior is calm, cooperative,  appropriate eye contact. Tattoos on both arms; pt states he designed these " "himself.  Attention/Alertness: Alert  Thought Process: Linear, Logical and Goal Directed; future-oriented thoughts present  Thought Content: Denies SI  Speech: Clear with normal rate and rhythm  Mood: \"Just trying not to think about what happened\"            Affect: Euthymic    SI/HI: Denies    Memory: Recent and remote memory appear intact    Orientation: alert, oriented to person, place and time  Insight into symptoms: Good  Judgement into symptoms:Fair    ASSESSMENT:     DSM5 Diagnostic Considerations:  1. Adjustment disorder with depressed mood      PLAN:  Legal status: Patient is ABLE to contact for safety and denies suicidal ideation. At this time, the patient DOES NOT meet criteria for legal hold. Legal hold discontinued.  Anticipate F/U within 48 hours.   Discussed patient with other provider: Dr. Douglas, Dr. Galdamez  Will continue to follow     Thank you for the consult.    "

## 2018-01-31 PROCEDURE — A9270 NON-COVERED ITEM OR SERVICE: HCPCS | Performed by: PSYCHIATRY & NEUROLOGY

## 2018-01-31 PROCEDURE — 770001 HCHG ROOM/CARE - MED/SURG/GYN PRIV*

## 2018-01-31 PROCEDURE — 99232 SBSQ HOSP IP/OBS MODERATE 35: CPT | Mod: 25 | Performed by: HOSPITALIST

## 2018-01-31 PROCEDURE — 700102 HCHG RX REV CODE 250 W/ 637 OVERRIDE(OP): Performed by: INTERNAL MEDICINE

## 2018-01-31 PROCEDURE — 99407 BEHAV CHNG SMOKING > 10 MIN: CPT | Performed by: HOSPITALIST

## 2018-01-31 PROCEDURE — 700102 HCHG RX REV CODE 250 W/ 637 OVERRIDE(OP): Performed by: PSYCHIATRY & NEUROLOGY

## 2018-01-31 PROCEDURE — 700111 HCHG RX REV CODE 636 W/ 250 OVERRIDE (IP): Performed by: INTERNAL MEDICINE

## 2018-01-31 PROCEDURE — A9270 NON-COVERED ITEM OR SERVICE: HCPCS | Performed by: INTERNAL MEDICINE

## 2018-01-31 RX ADMIN — SERTRALINE 100 MG: 100 TABLET, FILM COATED ORAL at 09:14

## 2018-01-31 RX ADMIN — ENOXAPARIN SODIUM 40 MG: 100 INJECTION SUBCUTANEOUS at 09:14

## 2018-01-31 RX ADMIN — ACETAMINOPHEN 650 MG: 325 TABLET, FILM COATED ORAL at 11:22

## 2018-01-31 RX ADMIN — NICOTINE 14 MG: 14 PATCH, EXTENDED RELEASE TRANSDERMAL at 05:31

## 2018-01-31 RX ADMIN — STANDARDIZED SENNA CONCENTRATE AND DOCUSATE SODIUM 2 TABLET: 8.6; 5 TABLET, FILM COATED ORAL at 21:15

## 2018-01-31 ASSESSMENT — ENCOUNTER SYMPTOMS
NEUROLOGICAL NEGATIVE: 1
NAUSEA: 0
MEMORY LOSS: 0
INSOMNIA: 0
ABDOMINAL PAIN: 0
HEARTBURN: 0
CONSTITUTIONAL NEGATIVE: 1
TREMORS: 0
ORTHOPNEA: 0
WEIGHT LOSS: 0
SPEECH CHANGE: 0
EYES NEGATIVE: 1
EYE PAIN: 0
SHORTNESS OF BREATH: 0
BRUISES/BLEEDS EASILY: 0
CLAUDICATION: 0
NECK PAIN: 0
RESPIRATORY NEGATIVE: 1
SPUTUM PRODUCTION: 0
MUSCULOSKELETAL NEGATIVE: 1
PHOTOPHOBIA: 0
GASTROINTESTINAL NEGATIVE: 1
FLANK PAIN: 0
CARDIOVASCULAR NEGATIVE: 1
MYALGIAS: 0
DEPRESSION: 0
VOMITING: 0
SENSORY CHANGE: 0
SINUS PAIN: 0
WEAKNESS: 0

## 2018-01-31 ASSESSMENT — PAIN SCALES - GENERAL
PAINLEVEL_OUTOF10: 0
PAINLEVEL_OUTOF10: 5

## 2018-01-31 NOTE — CARE PLAN
Problem: Safety  Goal: Will remain free from injury  Fall precautions in place, bed alarm on.    Problem: Venous Thromboembolism (VTW)/Deep Vein Thrombosis (DVT) Prevention:  Goal: Patient will participate in Venous Thrombosis (VTE)/Deep Vein Thrombosis (DVT)Prevention Measures  Lovenox given    Problem: Safety:  Goal: Will remain free from injury  Fall precautions in place, bed alarm on.

## 2018-01-31 NOTE — PROGRESS NOTES
[facility]    Date of Service: 2018    Chief Complaint  27 y.o. male admitted 2018 with suicidal attempt.    Interval Problem Update  Is a 27-year-old gentleman who try to commit suicide in his car. The patient left a suicide note. Patient also called his friend to let him know that he was committing suicide and where to location of his car was. Patient fairly took an overdose of over-the-counter cold medication as well as alcohol and possible over-the-counter sleep medication. Drug tests here have been negative. The patient this point remains in the legal hold and overnight and he has at this point no further suicidal ideation he is also quite comfortable and does not have any oxygen requirements or pain management requirements. At this point patient will disposition will be determined by psychiatric necessity for ongoing legal hold.    Consultants/Specialty  Psychiatry    Disposition  To be determined        Review of Systems   Constitutional: Negative.  Negative for malaise/fatigue and weight loss.   HENT: Negative.  Negative for congestion, sinus pain and tinnitus.    Eyes: Negative.  Negative for photophobia and pain.   Respiratory: Negative.  Negative for sputum production and shortness of breath.    Cardiovascular: Negative.  Negative for orthopnea and claudication.   Gastrointestinal: Negative.  Negative for abdominal pain, heartburn, nausea and vomiting.   Genitourinary: Negative.  Negative for dysuria and flank pain.   Musculoskeletal: Negative.  Negative for myalgias and neck pain.   Skin: Negative.    Neurological: Negative.  Negative for tremors, sensory change, speech change and weakness.   Endo/Heme/Allergies: Negative.  Does not bruise/bleed easily.   Psychiatric/Behavioral: Negative for depression, memory loss and suicidal ideas. The patient does not have insomnia.       Physical Exam  Laboratory/Imaging   Hemodynamics  Temp (24hrs), Av.1 °C (98.7 °F), Min:36.7 °C  (98.1 °F), Max:37.4 °C (99.3 °F)   Temperature: 36.8 °C (98.2 °F)  Pulse  Av.7  Min: 46  Max: 133    Blood Pressure: 112/54      Respiratory      Respiration: 16, Pulse Oximetry: 98 %     Work Of Breathing / Effort: Mild  RUL Breath Sounds: Clear, RML Breath Sounds: Clear, RLL Breath Sounds: Clear, JOSE RAMON Breath Sounds: Clear, LLL Breath Sounds: Clear    Fluids  No intake or output data in the 24 hours ending 18 1219    Nutrition  Orders Placed This Encounter   Procedures   • DIET ORDER     Standing Status:   Standing     Number of Occurrences:   1     Order Specific Question:   Diet:     Answer:   Regular [1]     Physical Exam   Constitutional: He is oriented to person, place, and time. He appears well-developed and well-nourished. No distress.   HENT:   Head: Normocephalic and atraumatic.   Right Ear: External ear normal.   Left Ear: External ear normal.   Mouth/Throat: No oropharyngeal exudate.   Eyes: Conjunctivae and EOM are normal. Pupils are equal, round, and reactive to light. No scleral icterus.   Neck: Normal range of motion. Neck supple. No tracheal deviation present.   Cardiovascular: Normal rate, regular rhythm and normal heart sounds.    Pulmonary/Chest: Effort normal and breath sounds normal. No stridor. No respiratory distress.   Abdominal: Soft. Bowel sounds are normal.   Musculoskeletal: Normal range of motion. He exhibits no edema or tenderness.   Neurological: He is alert and oriented to person, place, and time. He has normal reflexes. He displays normal reflexes. No cranial nerve deficit. He exhibits normal muscle tone. Coordination normal.   Skin: Skin is warm and dry. He is not diaphoretic. No pallor.   Psychiatric: He has a normal mood and affect. His speech is normal and behavior is normal. Judgment and thought content normal. Cognition and memory are normal. He expresses no suicidal ideation. He expresses no suicidal plans and no homicidal plans.   Nursing note and vitals  reviewed.                               Assessment/Plan     * Overdose- (present on admission)   Assessment & Plan    Patient try to commit suicide with an overdose of various agents including alcohol and over-the-counter sleeping medication and cold medication.  Patient initially did present with a suicide note and did call a friend to let him know his location.  At this point the patient is still no legal hold until psychiatry deems fit to take him off legal hold.          Acute respiratory failure with hypoxia (CMS-HCC)- (present on admission)   Assessment & Plan    His oxygenation at this point is back to baseline he no longer requires any oxygen support.          Suicide attempt- (present on admission)   Assessment & Plan    Patient remains a legal hold.   I've discussed his situation with psychiatry for now they are feeling that he needs to continue with the legal hold.  Patient does not seem to have in depth awareness of the consequences of what he has done.        Mild intermittent asthma without complication- (present on admission)   Assessment & Plan    Patient currently does not have any signs of asthma exacerbation and is doing rather well.        Chest wall pain- (present on admission)   Assessment & Plan    Patient today denies any chest wall pain and the patient at this point is pain free          Tobacco abuse disorder- (present on admission)   Assessment & Plan    -nicotine replacement protocol and cessation education provided for more than 10 minutes, discussed options of nicotine patch, acupuncture, medical treatment with wellbutrin and chantix. Discussed other options. Code 70546            Reviewed items::  EKG reviewed, Labs reviewed, Medications reviewed and Radiology images reviewed  Cheek catheter::  No Cheek  DVT prophylaxis pharmacological::  Enoxaparin (Lovenox)  Ulcer Prophylaxis::  Not indicated

## 2018-01-31 NOTE — DISCHARGE PLANNING
Medical Social Work     Referral: Legal Hold Court     Intervention: Pt presented for legal hold evaluation with . Recommendation per  is pt's legal hold will be continued for one week (2/8/18).      Plan: Pt's legal hold has been extended for one week. Pt awaiting transfer to an in patient psych facility.

## 2018-01-31 NOTE — PSYCHIATRY
"PSYCHOLOGICAL FOLLOW-UP:  Reason for admission: Altered mental state  Respiratory failure requiring intubation (CMS-HCC)  Suicide attempt  Altered mental state  Respiratory failure requiring intubation (CMS-[facility])  Suicide attempt  Supervising Physician: Suzan Galdamez MD    Length of Visit: 45min    Legal status: Legal Status: Involuntary    Chief Complaint: depression    HPI: Met with the patient for individual psychotherapy. Discussed coping and self-care skills and how each can help buffer against becoming overwhelmed with stress. The patient identified several self-care activities including making his environment more calm and welcoming by redecorating and working out several days a week. He also stated his intent to have his parents hide his knives for a couple months as he adjusts to his new life without a girlfriend. This writer encouraged the patient to continue identifying concrete plans for his self-care and coping goals.     Psychiatric Examination:  Vitals: Blood pressure 112/54, pulse 76, temperature 36.8 °C (98.2 °F), resp. rate 16, height 1.88 m (6' 2.02\"), weight 77.6 kg (171 lb 1.2 oz), SpO2 98 %.  Musculoskeletal: normal psychomotor activity, no tics or unusual mannerisms noted  Appearance and Eye Contact: appropriate dress and grooming. Behavior is calm, cooperative,  appropriate eye contact  Attention/Alertness: Alert  Thought Process: Linear, Logical and Goal Directed    Thought Content: Not significant for psychotic processes   Speech: Clear with normal rate and rhythm  Mood: \"ok\"            Affect: euthymic         SI/HI: Denies    Memory: Recent and remote memory appear intact    Orientation: alert, oriented to person, place and time  Insight into symptoms: Good  Judgement into symptoms:Good    ASSESSMENT: The patient's legal hold was extended by the courts today. The patient continues to remain uncertain if he will be able to pursue outpatient psychotherapy on his own. This is somewhat " concerning. However, he does believe his mother could help him establish himself with services. The patient will be seen by the team psychiatrist (patient's primary provider on the psychiatric team) to reevaluate his legal hold status.      DSM5 Diagnostic Considerations:   Adjustment disorder with depressed mood     Rule out:  Major Depressive Disorder   Unspecified Anxiety Disorder        PLAN:  Legal status: Involuntary.   Anticipate F/U within 48 hours.   Records reviewed: yes  Discussed patient with other provider: yes  Psychiatry will continue to follow while the patient is in the hospital   Thank you for the consult.     Eloisa Douglas, PhD

## 2018-01-31 NOTE — CARE PLAN
Problem: Venous Thromboembolism (VTW)/Deep Vein Thrombosis (DVT) Prevention:  Goal: Patient will participate in Venous Thrombosis (VTE)/Deep Vein Thrombosis (DVT)Prevention Measures  Pharmacological DVT/VTE prophylactic intervetion implemented.    Problem: Psychosocial Needs:  Goal: Level of anxiety will decrease  Pt reassured and encouraged to talk about concerns.

## 2018-01-31 NOTE — PROGRESS NOTES
Recd report, assumed care. Pt A&O*4, denies pain. VSS. Assessment complete. Legal hold, sitter at bedside. All needs met. Fall precautions in place, call light in reach, white board updated, hourly rounding in place. Will continue to monitor.

## 2018-01-31 NOTE — CARE PLAN
Problem: Communication  Goal: The ability to communicate needs accurately and effectively will improve  Outcome: PROGRESSING AS EXPECTED  Patient able to communicate needs effectively and uses call light appropriately. RN hourly rounding in place.     Problem: Safety  Goal: Will remain free from injury  Outcome: PROGRESSING AS EXPECTED  Patient remains free from injury. Patient uses call light when necessary. Nursing staff assists with ambulation. Patient educated on injury prevention.     Problem: Safety:  Goal: Will remain free from injury  Outcome: PROGRESSING AS EXPECTED  Patient remains free from injury. Patient uses call light when necessary. Nursing staff assists with ambulation. Patient educated on injury prevention.

## 2018-01-31 NOTE — PROGRESS NOTES
Patient resting quietly in bed, no S/S of distress, AA&Ox4. Denies SI/HI. Denies SOB, chest pain, dizziness. 1:1 sitter at bedside, call light within reach,  pt calls appropriately and does not get out of bed. Bed in lowest position, bed locked, RN and CNA numbers provided, no further needs at this time. No changes from EPIC. Hourly rounding in place.

## 2018-02-01 PROCEDURE — 700102 HCHG RX REV CODE 250 W/ 637 OVERRIDE(OP): Performed by: INTERNAL MEDICINE

## 2018-02-01 PROCEDURE — 700111 HCHG RX REV CODE 636 W/ 250 OVERRIDE (IP): Performed by: INTERNAL MEDICINE

## 2018-02-01 PROCEDURE — 700102 HCHG RX REV CODE 250 W/ 637 OVERRIDE(OP): Performed by: PSYCHIATRY & NEUROLOGY

## 2018-02-01 PROCEDURE — A9270 NON-COVERED ITEM OR SERVICE: HCPCS | Performed by: INTERNAL MEDICINE

## 2018-02-01 PROCEDURE — 770001 HCHG ROOM/CARE - MED/SURG/GYN PRIV*

## 2018-02-01 PROCEDURE — A9270 NON-COVERED ITEM OR SERVICE: HCPCS | Performed by: PSYCHIATRY & NEUROLOGY

## 2018-02-01 PROCEDURE — 99232 SBSQ HOSP IP/OBS MODERATE 35: CPT | Performed by: HOSPITALIST

## 2018-02-01 RX ADMIN — STANDARDIZED SENNA CONCENTRATE AND DOCUSATE SODIUM 2 TABLET: 8.6; 5 TABLET, FILM COATED ORAL at 07:45

## 2018-02-01 RX ADMIN — ENOXAPARIN SODIUM 40 MG: 100 INJECTION SUBCUTANEOUS at 07:45

## 2018-02-01 RX ADMIN — NICOTINE 14 MG: 14 PATCH, EXTENDED RELEASE TRANSDERMAL at 07:45

## 2018-02-01 RX ADMIN — SERTRALINE 100 MG: 100 TABLET, FILM COATED ORAL at 07:45

## 2018-02-01 ASSESSMENT — PATIENT HEALTH QUESTIONNAIRE - PHQ9
2. FEELING DOWN, DEPRESSED, IRRITABLE, OR HOPELESS: NOT AT ALL
SUM OF ALL RESPONSES TO PHQ9 QUESTIONS 1 AND 2: 0
SUM OF ALL RESPONSES TO PHQ QUESTIONS 1-9: 0
1. LITTLE INTEREST OR PLEASURE IN DOING THINGS: NOT AT ALL

## 2018-02-01 ASSESSMENT — ENCOUNTER SYMPTOMS
NEUROLOGICAL NEGATIVE: 1
MUSCULOSKELETAL NEGATIVE: 1
HEADACHES: 0
STRIDOR: 0
DIZZINESS: 0
EYES NEGATIVE: 1
FEVER: 0
EYE DISCHARGE: 0
DIARRHEA: 0
BACK PAIN: 0
CONSTIPATION: 0
SORE THROAT: 0
ABDOMINAL PAIN: 0
CHILLS: 0
SHORTNESS OF BREATH: 0
NAUSEA: 0
COUGH: 0
CARDIOVASCULAR NEGATIVE: 1
GASTROINTESTINAL NEGATIVE: 1
DEPRESSION: 0
CONSTITUTIONAL NEGATIVE: 1
RESPIRATORY NEGATIVE: 1
TINGLING: 0
DOUBLE VISION: 0
WHEEZING: 0
PALPITATIONS: 0
BRUISES/BLEEDS EASILY: 0

## 2018-02-01 ASSESSMENT — LIFESTYLE VARIABLES: SUBSTANCE_ABUSE: 0

## 2018-02-01 ASSESSMENT — PAIN SCALES - GENERAL: PAINLEVEL_OUTOF10: 0

## 2018-02-01 NOTE — PSYCHIATRY
"PSYCHOLOGICAL FOLLOW-UP:  Reason for admission: Altered mental state  Respiratory failure requiring intubation (CMS-[facility])  Suicide attempt  Altered mental state  Respiratory failure requiring intubation (CMS-[facility])  Suicide attempt  Supervising Physician: Suzan Galdamez MD    Length of Visit: 60min    Legal status: Legal Status: Involuntary    Chief Complaint: \"good\"    HPI: Met with the patient for brief psychotherapy. Continued to discuss coping skills and plans for self care. Also discussed outpatient psychotherapy and psychiatry. The patient reported a plan to quit his second job to focus on self-care. He stated a plan to work out using home weights and resistance bands three days a week for about 15-20 min each morning and rearrange his room using furniture and decorations that are more welcoming and calming. The patient reported a plan to engage in self-care by listening to music and/or taking a break from cleaning if he begins to feel depressed and anxious again this weekend. Finally, this writer encouraged the patient to go to outpatient psychotherapy and psychiatry once he is discharged. The patient reported a plan to sign up for online psychotherapy services that have licensed therapists stating that this might be a good way to start since it wont be as \"intimidating.\" This writer praised the client for engaging in self care and encouraged him to engage in any type of psychotherapy so that he has dedicated time to himself every week.     Psychiatric Examination:  Vitals: Blood pressure 120/66, pulse 69, temperature 36.5 °C (97.7 °F), resp. rate 18, height 1.88 m (6' 2.02\"), weight 77.6 kg (171 lb 1.2 oz), SpO2 97 %.  Musculoskeletal: normal psychomotor activity, no tics or unusual mannerisms noted  Appearance and Eye Contact: appropriate dress and grooming. Behavior is calm, cooperative,  appropriate eye contact  Attention/Alertness: Alert  Thought Process: Linear, Logical and Goal Directed    Thought Content: " "Not significant for Si, Hi, or psychotic content   Speech: Clear with normal rate and rhythm  Mood: \"good\"            Affect: euthymic         SI/HI: Denies both    Memory: Recent and remote memory appear intact    Orientation: alert, oriented to person, place and time  Insight into symptoms: Good  Judgement into symptoms:Good    ASSESSMENT:     DSM5 Diagnostic Considerations:   Adjustment disorder with depressed and anxious mood    Rule out:  Major Depressive Disorder   Unspecified Anxiety Disorder        PLAN:  Legal status: Involuntary.   Anticipate F/U within 48 hours.   Records reviewed: yes  Discussed patient with other provider: yes  Psychiatry will continue to follow while the patient is in the hospital   Thank you for the consult.     Eloisa Douglas, PhD      "

## 2018-02-01 NOTE — PSYCHIATRY
"PSYCHIATRIC FOLLOW UP:    Reason for Admission:Altered mental state, Respiratory failure requiring intubation, Suicide attempt   Legal hold status: +: likely dc in am      Doing better. Denies SI. States depression at the moment we are talking is a 2 (1=the best) but anxiety runs about a 7.  Discussed going home, how safe he feels, etc. He seems optimistic. Father apologized for remarks made. No side effects. Sleeping well. He can return to his job, which he likes and likes the people he works with. States one person there has a hx of trying to kill self and is understanding.    Psychiatric Examination: observed phenomenon:  Vitals:Blood pressure 120/66, pulse 69, temperature 36.5 °C (97.7 °F), resp. rate 18, height 1.88 m (6' 2.02\"), weight 77.6 kg (171 lb 1.2 oz), SpO2 97 %.  Appearance:clean, normal habitus, cooperative  Thoughts:linear, no psychosis  Speech:wnl  Mood:\"better\"  Affect: euthymic  SI/HI:   denies  Attention/Alertness: intact    Memory: intact  Orientation:x 4  Fund of Knowledge: not tested     Insight/Judgement into symptoms:improved.     Assessment:(acuity level)   Adjustment Disorder with depressed and anxious mood: stable         Plan:will talk to him in early am: if he remains consistent will lift legal hold. Spoke with mom who appears very supportive. Will need a script for zoloft.  legal hold:extended.  Anticipated F/U: within 24 hours.     Will follow             "

## 2018-02-01 NOTE — CARE PLAN
Problem: Safety  Goal: Will remain free from injury  Patient denies SI/HI. Contracted for safety.   Goal: Will remain free from falls  Able to use call light appropriately. Aox4.    Problem: Safety:  Goal: Will remain free from injury  Patient denies SI/HI. Contracted for safety.

## 2018-02-01 NOTE — PROGRESS NOTES
[facility] Progress [facility]    Date of Service: 2018    Chief Complaint  27 y.o. male admitted 2018 with suicidal attempt.    Interval Problem Update  Patient attempted to commit suicide in a car. The patient took a polypharmacy overdose. Patient also took alcohol. Patient has no insight. When psychiatry interviewed him and asked him if he would be able to make his outpatient psychiatric appointments the patient replied that he would not be able to. The courts at this point have extended his legal hold. The patient at this point is medically cleared. He is at this point pending psychiatric transfer to an inpatient facility.    Consultants/Specialty  Psychiatry    Disposition  To be determined        Review of Systems   Constitutional: Negative.  Negative for chills and fever.   HENT: Negative.  Negative for ear discharge, ear pain and sore throat.    Eyes: Negative.  Negative for double vision and discharge.   Respiratory: Negative.  Negative for cough, shortness of breath, wheezing and stridor.    Cardiovascular: Negative.  Negative for palpitations and leg swelling.   Gastrointestinal: Negative.  Negative for abdominal pain, constipation, diarrhea and nausea.   Genitourinary: Negative.  Negative for frequency and hematuria.   Musculoskeletal: Negative.  Negative for back pain and joint pain.   Skin: Negative.  Negative for itching and rash.   Neurological: Negative.  Negative for dizziness, tingling and headaches.   Endo/Heme/Allergies: Negative.  Does not bruise/bleed easily.   Psychiatric/Behavioral: Negative for depression, substance abuse and suicidal ideas.      Physical Exam  Laboratory/Imaging   Hemodynamics  Temp (24hrs), Av.7 °C (98.1 °F), Min:36.5 °C (97.7 °F), Max:36.9 °C (98.5 °F)   Temperature: 36.5 °C (97.7 °F)  Pulse  Av.5  Min: 46  Max: 133    Blood Pressure: 120/66      Respiratory      Respiration: 18, Pulse Oximetry: 97 %        RUL Breath Sounds: Clear, RML Breath Sounds:  Clear, RLL Breath Sounds: Clear, JOSE RAMON Breath Sounds: Clear, LLL Breath Sounds: Clear    Fluids    Intake/Output Summary (Last 24 hours) at 02/01/18 1647  Last data filed at 02/01/18 0745   Gross per 24 hour   Intake              250 ml   Output                0 ml   Net              250 ml       Nutrition  Orders Placed This Encounter   Procedures   • DIET ORDER     Standing Status:   Standing     Number of Occurrences:   1     Order Specific Question:   Diet:     Answer:   Regular [1]     Physical Exam   Constitutional: He is oriented to person, place, and time. He appears well-developed and well-nourished.   HENT:   Head: Normocephalic and atraumatic.   Right Ear: External ear normal.   Left Ear: External ear normal.   Eyes: Conjunctivae and EOM are normal. Pupils are equal, round, and reactive to light.   Neck: Normal range of motion. Neck supple. No JVD present. No thyromegaly present.   Cardiovascular: Normal rate, regular rhythm and normal heart sounds.  Exam reveals no friction rub.    No murmur heard.  Pulmonary/Chest: Effort normal and breath sounds normal. He has no wheezes. He has no rales.   Abdominal: Soft. Bowel sounds are normal. He exhibits no distension. There is no tenderness.   Musculoskeletal: Normal range of motion. He exhibits no edema or deformity.   Lymphadenopathy:     He has no cervical adenopathy.   Neurological: He is alert and oriented to person, place, and time. He has normal reflexes. No cranial nerve deficit. Coordination normal.   Skin: Skin is warm and dry. No rash noted. No erythema.   Psychiatric: He has a normal mood and affect. His speech is normal and behavior is normal. Judgment and thought content normal. Cognition and memory are normal. He expresses no suicidal ideation. He expresses no suicidal plans and no homicidal plans.   Nursing note and vitals reviewed.                               Assessment/Plan     * Overdose- (present on admission)   Assessment & Plan    Patient  try to commit suicide with polypharmacy overdose. He also left a suicide note. He also called his friend to let him know where to find him. The friend called paramedics and had him brought to the hospital. Patient did have gastric lavage done. The patient remains at this point on legal hold. The courts of extended the legal hold at this point to an indefinite period.          Acute respiratory failure with hypoxia (CMS-HCC)- (present on admission)   Assessment & Plan    Resolved          Suicide attempt- (present on admission)   Assessment & Plan    Patient states he's no longer suicidal.  Patient at this point remains a legal hold. The courts have extended his legal hold.        Mild intermittent asthma without complication- (present on admission)   Assessment & Plan    Stable        Chest wall pain- (present on admission)   Assessment & Plan    Resolved          Tobacco abuse disorder- (present on admission)   Assessment & Plan    -nicotine replacement protocol and cessation education provided for more than 10 minutes, discussed options of nicotine patch, acupuncture, medical treatment with wellbutrin and chantix. Discussed other options. Code 50980            Reviewed items::  EKG reviewed, Labs reviewed, Medications reviewed and Radiology images reviewed  Cheek catheter::  No Cheek  DVT prophylaxis pharmacological::  Enoxaparin (Lovenox)  Ulcer Prophylaxis::  Not indicated

## 2018-02-01 NOTE — PROGRESS NOTES
Patient is alert and oriented x 4. Denies any pain or discomfort. Denies SI/HI. Has 1 on 1 sitter in room. Call light within reach.

## 2018-02-01 NOTE — CARE PLAN
Problem: Urinary Elimination:  Goal: Ability to reestablish a normal urinary elimination pattern will improve  Outcome: MET Date Met: 02/01/18

## 2018-02-01 NOTE — CARE PLAN
Problem: Bowel/Gastric:  Goal: Will not experience complications related to bowel motility  Outcome: MET Date Met: 02/01/18

## 2018-02-01 NOTE — PROGRESS NOTES
Aox4. Denies any pain or discomfort. On legal hold, with 1 on 1 sitter. Denies SI/HI. Will continue to monitor.

## 2018-02-02 ENCOUNTER — PATIENT OUTREACH (OUTPATIENT)
Dept: HEALTH INFORMATION MANAGEMENT | Facility: OTHER | Age: 28
End: 2018-02-02

## 2018-02-02 VITALS
SYSTOLIC BLOOD PRESSURE: 117 MMHG | HEIGHT: 74 IN | DIASTOLIC BLOOD PRESSURE: 68 MMHG | WEIGHT: 171.08 LBS | OXYGEN SATURATION: 97 % | RESPIRATION RATE: 16 BRPM | TEMPERATURE: 98.7 F | HEART RATE: 70 BPM | BODY MASS INDEX: 21.96 KG/M2

## 2018-02-02 PROCEDURE — 99239 HOSP IP/OBS DSCHRG MGMT >30: CPT | Performed by: HOSPITALIST

## 2018-02-02 PROCEDURE — 700102 HCHG RX REV CODE 250 W/ 637 OVERRIDE(OP): Performed by: INTERNAL MEDICINE

## 2018-02-02 PROCEDURE — 700102 HCHG RX REV CODE 250 W/ 637 OVERRIDE(OP): Performed by: PSYCHIATRY & NEUROLOGY

## 2018-02-02 PROCEDURE — 700111 HCHG RX REV CODE 636 W/ 250 OVERRIDE (IP): Performed by: INTERNAL MEDICINE

## 2018-02-02 PROCEDURE — A9270 NON-COVERED ITEM OR SERVICE: HCPCS | Performed by: INTERNAL MEDICINE

## 2018-02-02 PROCEDURE — A9270 NON-COVERED ITEM OR SERVICE: HCPCS | Performed by: PSYCHIATRY & NEUROLOGY

## 2018-02-02 RX ORDER — SERTRALINE HYDROCHLORIDE 100 MG/1
100 TABLET, FILM COATED ORAL DAILY
Qty: 30 TAB | Refills: 11 | Status: SHIPPED | OUTPATIENT
Start: 2018-02-02

## 2018-02-02 RX ADMIN — NICOTINE 14 MG: 14 PATCH, EXTENDED RELEASE TRANSDERMAL at 05:39

## 2018-02-02 RX ADMIN — SERTRALINE 100 MG: 100 TABLET, FILM COATED ORAL at 09:07

## 2018-02-02 RX ADMIN — ENOXAPARIN SODIUM 40 MG: 100 INJECTION SUBCUTANEOUS at 09:07

## 2018-02-02 ASSESSMENT — PAIN SCALES - GENERAL
PAINLEVEL_OUTOF10: 0

## 2018-02-02 NOTE — CARE PLAN
Problem: Safety  Goal: Will remain free from injury    Intervention: Provide a safe environment  All potentially dangerous items have been removed from patient's room. Patient has a 1:1 sitter at bedside at all times.      Problem: Knowledge Deficit  Goal: Knowledge of disease process/condition, treatment plan, diagnostic tests, and medications will improve    Intervention: Explain information regarding disease process/condition, treatment plan, diagnostic tests, and medications and document in education  Explained POC for the evening. Patient was agreeable to this plan.       Problem: Safety:  Goal: Will remain free from injury    Intervention: Provide a safe environment  All potentially dangerous items have been removed from patient's room. Patient has a 1:1 sitter at bedside at all times.

## 2018-02-02 NOTE — PSYCHIATRY
"PSYCHIATRIC FOLLOW UP:    Reason for Admission: Altered mental state, Respiratory failure requiring intubation, Suicide attempt   Legal hold status: +, now dc'd      Slept well, mood is good and he feels \"in control\" though still anxious.    Psychiatric Examination: observed phenomenon:  Vitals:Blood pressure 117/68, pulse 70, temperature 37.1 °C (98.7 °F), resp. rate 16, height 1.88 m (6' 2.02\"), weight 77.6 kg (171 lb 1.2 oz), SpO2 97 %.  Musculoskeletal(abnormal movements, gait, etc):none noted  Appearance:clean, normal habitus, cooperative  Thoughts:linear, no psychosis  Speech:wnl  Mood:depression \"good\" but anxious  Affect: euthymic  SI/HI:   denies  Attention/Alertness: intact    Memory: intact  Orientation:x 4  Fund of Knowledge: not tested     Insight/Judgement into symptoms:intact     Assessment:(acuity level)   Adjustment Disorder with depressed and anxious mood: stable          Plan:  legal hold:dc, will need script for zoloft. Referrals.     Signing off            "

## 2018-02-02 NOTE — DISCHARGE SUMMARY
CHIEF COMPLAINT ON ADMISSION  Chief Complaint   Patient presents with   • ALOC     Pt found unconscious, GCS 3, ne evidence of head trauma.   A/Ox1, actively vomiting   • Drug Overdose     Pt wrote suicide note, possible multi drug OD. +etoh       CODE STATUS  Full Code    HPI & HOSPITAL COURSE  This is a 27 y.o. male here with suicidal ideation. The patient apparently had multiple fights with his girlfriend as well as financial issues and is well as job issues. The patient thus decided to end it all he went into his car where he then took a polypharmacy overdose with alcohol included. Patient called his friend and let him know where his location was. The friend then called paramedics he was brought into the hospital gastric lavage was done for him patient after was admitted to the medical floor for continued legal hold. Psychiatry evaluated the patient and treated him with an optimization of his anti-psychiatric medications patient at this point is no longer suicidal psychiatry disorders taken off the legal hold at this point patient is able to discharge as an outpatient with Zoloft as well as outpatient psychiatric follow-ups.    The patient met 2-midnight criteria for an inpatient stay at the time of discharge.    Therefore, he is discharged in good and stable condition with close outpatient follow-up.    SPECIFIC OUTPATIENT FOLLOW-UP  Follow with psychiatry and primary care physician in 7-10 days    DISCHARGE PROBLEM LIST  Principal Problem:    Overdose POA: Yes  Active Problems:    Suicide attempt POA: Yes    Acute respiratory failure with hypoxia (CMS-HCC) POA: Yes    Chest wall pain POA: Yes    Mild intermittent asthma without complication POA: Yes    Tobacco abuse disorder POA: Yes  Resolved Problems:    Hypokalemia POA: Unknown      FOLLOW UP  No future appointments.  No follow-up provider specified.    MEDICATIONS ON DISCHARGE   Igor Kapadia   Home Medication Instructions [id number]     Printed on:02/02/18 6862   Medication Information                      albuterol 108 (90 Base) MCG/ACT Aero Soln inhalation aerosol  Inhale 2 Puffs by mouth every 6 hours as needed for Shortness of Breath.             sertraline (ZOLOFT) 100 MG Tab  Take 1 Tab by mouth every day.                 DIET  Orders Placed This Encounter   Procedures   • DIET ORDER     Standing Status:   Standing     Number of Occurrences:   1     Order Specific Question:   Diet:     Answer:   Regular [1]       ACTIVITY  As tolerated.  Weight bearing as tolerated      CONSULTATIONS  Psychiatry    PROCEDURES  None    LABORATORY  Lab Results   Component Value Date/Time    SODIUM 137 01/28/2018 07:46 AM    POTASSIUM 4.4 01/28/2018 07:46 AM    CHLORIDE 107 01/28/2018 07:46 AM    CO2 25 01/28/2018 07:46 AM    GLUCOSE 100 (H) 01/28/2018 07:46 AM    BUN 10 01/28/2018 07:46 AM    CREATININE 0.81 01/28/2018 07:46 AM        Lab Results   Component Value Date/Time    WBC 5.6 01/28/2018 07:46 AM    HEMOGLOBIN 14.7 01/28/2018 07:46 AM    HEMATOCRIT 43.1 01/28/2018 07:46 AM    PLATELETCT 242 01/28/2018 07:46 AM        Total time of the discharge process exceeds 45 minutes

## 2018-02-02 NOTE — PROGRESS NOTES
Patient is refusing bed alarm. Education given. Patient was observed up self, requires no assistance from staff. Steady gait noted.

## 2018-02-02 NOTE — DISCHARGE INSTRUCTIONS
Discharge patient Home   Diet regular with 9-13 servings of fruits and vegetables   Activities as tolerated   Follow ups with PCP in 7-10 days, call for appointment   Follow up with psychiatry in 1-2 weeks   Meds per med rec sheet   No smoking, no alcohol, no caffeine   Wear seat belt in motorized vehicle   Take medications as perscribed   Keep appointments   If symptoms worsen call PCP, 911 or urgent care.    Respiratory failure is when your lungs are not working well and your breathing (respiratory) system fails. When respiratory failure occurs, it is difficult for your lungs to get enough oxygen, get rid of carbon dioxide, or both. Respiratory failure can be life threatening.   Respiratory failure can be acute or chronic. Acute respiratory failure is sudden, severe, and requires emergency medical treatment. Chronic respiratory failure is less severe, happens over time, and requires ongoing treatment.   WHAT ARE THE CAUSES OF ACUTE RESPIRATORY FAILURE?   Any problem affecting the heart or lungs can cause acute respiratory failure. Some of these causes include the following:  · Chronic bronchitis and emphysema (COPD).    · Blood clot going to a lung (pulmonary embolism).    · Having water in the lungs caused by heart failure, lung injury, or infection (pulmonary edema).    · Collapsed lung (pneumothorax).    · Pneumonia.    · Pulmonary fibrosis.    · Obesity.    · Asthma.    · Heart failure.    · Any type of trauma to the chest that can make breathing difficult.    · Nerve or muscle diseases making chest movements difficult.  WHAT SYMPTOMS SHOULD YOU WATCH FOR?   If you have any of these signs or symptoms, you should seek immediate medical care:   · You have shortness of breath (dyspnea) with or without activity.    · You have rapid, fast breathing (tachypnea).    · You are wheezing.  · You are unable to say more than a few words without having to catch your breath.  · You find it very difficult to function  normally.  · You have a fast heart rate.    · You have a bluish color to your finger or toe nail beds.    · You have confusion or drowsiness or both.    HOW WILL MY ACUTE RESPIRATORY FAILURE BE TREATED?   Treatment of acute respiratory failure depends on the cause of the respiratory failure. Usually, you will stay in the intensive care unit so your breathing can be watched closely. Treatment can include the following:  · Oxygen. Oxygen can be delivered through the following:  ¨ Nasal cannula. This is small tubing that goes in your nose to give you oxygen.  ¨ Face mask. A face mask covers your nose and mouth to give you oxygen.  · Medicine. Different medicines can be given to help with breathing. These can include:  ¨ Nebulizers. Nebulizers deliver medicines to open the air passages (bronchodilators). These medicines help to open or relax the airways in the lungs so you can breathe better. They can also help loosen mucus from your lungs.  ¨ Diuretics. Diuretic medicines can help you breathe better by getting rid of extra water in your body.  ¨ Steroids. Steroid medicines can help decrease swelling (inflammation) in your lungs.  ¨ Antibiotics.  · Chest tube. If you have a collapsed lung (pneumothorax), a chest tube is placed to help reinflate the lung.  · Non-invasive positive pressure ventilation (NPPV). This is a tight-fitting mask that goes over your nose and mouth. The mask has tubing that is attached to a machine. The machine blows air into the tubing, which helps to keep the tiny air sacs (alveoli) in your lungs open. This machine allows you to breathe on your own.  · Ventilator. A ventilator is a breathing machine. When on a ventilator, a breathing tube is put into the lungs. A ventilator is used when you can no longer breathe well enough on your own. You may have low oxygen levels or high carbon dioxide (CO2) levels in your blood. When you are on a ventilator, sedation and pain medicines are given to make you  sleep so your lungs can heal.     This information is not intended to replace advice given to you by your health care provider. Make sure you discuss any questions you have with your health care provider.     Document Released: 12/23/2014 Document Revised: 01/08/2016 Document Reviewed: 12/23/2014  [employer] Patient Education ©2016 [employer]    Discharge Instructions    Discharged to home by car with relative. Discharged via wheelchair, hospital escort: Yes.  Special equipment needed: Not Applicable    Be sure to schedule a follow-up appointment with your primary care doctor or any specialists as instructed.     Discharge Plan:   Smoking Cessation Offered: Patient Counseled  Influenza Vaccine Indication: Indicated: 9 to 64 years of age  Influenza Vaccine Given - only chart on this line when given: Influenza Vaccine Given (See MAR)    I understand that a diet low in cholesterol, fat, and sodium is recommended for good health. Unless I have been given specific instructions below for another diet, I accept this instruction as my diet prescription.   Other diet: Regular    Special Instructions: None    · Is patient discharged on Warfarin / Coumadin?   No     Depression / Suicide Risk    As you are discharged from this [facility] facility, it is important to learn how to keep safe from harming yourself.    Recognize the warning signs:  · Abrupt changes in personality, positive or negative- including increase in energy   · Giving away possessions  · Change in eating patterns- significant weight changes-  positive or negative  · Change in sleeping patterns- unable to sleep or sleeping all the time   · Unwillingness or inability to communicate  · Depression  · Unusual sadness, discouragement and loneliness  · Talk of wanting to die  · Neglect of personal appearance   · Rebelliousness- reckless behavior  · Withdrawal from people/activities they love  · Confusion- inability to concentrate     If you or a  loved one observes any of these behaviors or has concerns about self-harm, here's what you can do:  · Talk about it- your feelings and reasons for harming yourself  · Remove any means that you might use to hurt yourself (examples: pills, rope, extension cords, firearm)  · Get professional help from the community (Mental Health, Substance Abuse, psychological counseling)  · Do not be alone:Call your Safe Contact- someone whom you trust who will be there for you.  · Call your local CRISIS HOTLINE 538-7774 or 663-904-4143  · Call your local Children's Mobile Crisis Response Team [place] (472) 580-8744 or www.[employer]Dust  · Call the toll free National Suicide Prevention Hotlines   · National Suicide Prevention Lifeline 537-909-WBXP (8033)  · National Hope Line Network 800-SUICIDE (333-6152)

## 2018-02-02 NOTE — PROGRESS NOTES
CN spoke with Dr. Galdamez, who verified plan for d/c and discontinuation of legal hold.  Per Dr. Galdamez, the written order will suffice for documentation.

## 2018-02-02 NOTE — PROGRESS NOTES
Assumed care of patient at 1900 from dayshift Rn. Patient was sitting upright in bed watching tv. Introduced self and explained POC for the evening. Patient was agreeable to this plan and had no additional questions or concerns for staff at this time. Addressed any other needs while in room. Call light is within sitter's reach. Educated patient to notify the sitter to tell me of any needs throughout throughout the night. Patient verbalized understanding.

## 2018-02-02 NOTE — PROGRESS NOTES
Discharge instructions reviewed with pt including medications and follow up appointments. IV removed, tip intact.

## 2018-02-02 NOTE — PROGRESS NOTES
Pt A&Ox4, denies any numbness/tingling/pain.    Call light within reach, pt educated on importance of using the call light when he needs assistance, pt verbalized understanding.    Sitter at bedside.

## 2018-02-03 ENCOUNTER — PATIENT OUTREACH (OUTPATIENT)
Dept: HEALTH INFORMATION MANAGEMENT | Facility: OTHER | Age: 28
End: 2018-02-03

## 2018-02-03 NOTE — PROGRESS NOTES
Placed discharge outreach phone call to patient s/p hospital discharge 2/2/18.  Left voicemail providing my contact information and instructions to call with any questions or concerns.

## 2018-04-18 NOTE — PROGRESS NOTES
Pulmonary Critical Care Progress Note        Date of Service:  1/26/2018  Chief Complaint: found unresponsive in car     History of Present Illness: 27 y.o. male admitted for VDRF from attempted suicide    ROS:  Mild chest discomfort.  No shortness of breath    Interval Events:  24 hour interval history reviewed    - no events overnight   - neuro intact   - SR 60s   - SBPs 100-140s   - ambulating around ICU and to the bathroom   - still tolerating regular meals   - adequate UOP   - no respiratory issues   - tolerating sertraline   - no pharmacy issues    Yesterday's Events:   - no events overnight   - neuro intact, but subdued   - SB/SR 50-60s   - SBP 110s   - ambulating to the bathroom   - tolerating regular meals   - adequate UOP   - no respiratory issues   - sertraline started by psych    - no pharmacy issues    PFSH:  No change.  Gen: pleasant/cooperative, speaking in full sentences, no distress  Skin: warm/dry, no rashes    Respiratory:     Pulse Oximetry: 95 %    Exam: clear throughout, no wheezing  ImagingAvailable data reviewed         Invalid input(s): [id number]    HemoDynamics:  Pulse: 60, Heart Rate (Monitored): (!) 59  NIBP: 105/69       Exam: RRR, no murmur, no pedal edema, 2+ dpp=, good cap refill (no change)  Imaging: Available data reviewed        Neuro:  GCS Total [place] Coma Score: 15       Exam: clear speech, CN II- XII grossly intact, motor/sensory intact, normal gait, speaking in full sentences, answering all appropriately (no changes)  Imaging: Available data reviewed    Fluids:  Intake/Output       01/24/18 0700 - 01/25/18 0659 01/25/18 0700 - 01/26/18 0659 01/26/18 0700 - 01/27/18 0659      [id number] 1726-4047 Total 4693-3939 1596-6550 Total 9002-6940 2141-3017 Total       Intake    P.O.  900  1500 2400  1530  750 2280  --  -- --    P.O. 900 1500 2400 5947 066 8669 -- -- --    I.V.  --  -- --  0  -- 0  --  -- --    Propofol Volume -- -- -- 0 -- 0 -- -- --    IV Volume (NS) -- -- -- 0 --  0 -- -- --    Total Intake 900 1500 2400 5939 075 9255 -- -- --       Output    Urine  --  -- --  --  -- --  --  -- --    Number of Times Voided 4 x 4 x 8 x 4 x 2 x 6 x -- -- --    Stool  --  -- --  --  -- --  --  -- --    Number of Times Stooled -- 0 x 0 x 1 x 0 x 1 x -- -- --    Total Output -- -- -- -- -- -- -- -- --       Net I/O     900 1500 2400 4568 914 3197 -- -- --        Weight: 77.6 kg (171 lb 1.2 oz)  Recent Labs      18   0540   SODIUM  See comment  139   POTASSIUM  See comment  4.1   CHLORIDE  See comment  108   CO2  23  24   BUN  7*  8   CREATININE  0.67  0.83   MAGNESIUM  1.8  1.8   PHOSPHORUS  3.2  4.0   CALCIUM  9.1  9.5       GI/Nutrition:  Exam: (+)bs, soft, NT/ND, no masses (no changes)  Imaging: Available data reviewed  Tolerating regular diet   Liver Function  Recent Labs      18   0626  18   0540   ALTSGPT  16  20   ASTSGOT  16  24   ALKPHOSPHAT  65  73   TBILIRUBIN  1.1  0.7   GLUCOSE  92  92       Heme:  Recent Labs      18   RBC  4.31*   HEMOGLOBIN  12.9*   HEMATOCRIT  39.1*   PLATELETCT  212       Infectious Disease:  Temp  Av.8 °C (98.3 °F)  Min: 36.2 °C (97.2 °F)  Max: 37 °C (98.6 °F)  Micro: reviewed  Recent Labs      18   0540   WBC  6.6   --    NEUTSPOLYS  59.80   --    LYMPHOCYTES  27.50   --    MONOCYTES  9.40   --    EOSINOPHILS  2.30   --    BASOPHILS  0.50   --    ASTSGOT  16  24   ALTSGPT  16  20   ALKPHOSPHAT  65  73   TBILIRUBIN  1.1  0.7     Current Facility-Administered Medications   Medication Dose Frequency Provider Last Rate Last Dose   • nicotine (NICODERM) 14 MG/24HR 14 mg  14 mg Daily-06 Tunde Reed M.D.   14 mg at 18 2220   • acetaminophen (TYLENOL) tablet 650 mg  650 mg Q6HRS PRN Carole Slater M.D.   650 mg at 18 1843   • sertraline (ZOLOFT) tablet 50 mg  50 mg DAILY Greg Spence D.O.   50 mg at 18 0810   • albuterol inhaler 2 Puff  2 Puff Q4HRS PRN Bandar EUGENE  Latha Cerda M.D.       • Respiratory Care per Protocol   Continuous RT Tunde Reed M.D.       • senna-docusate (PERICOLACE or SENOKOT S) 8.6-50 MG per tablet 2 Tab  2 Tab BID Tunde Reed M.D.   2 Tab at 01/25/18 0810    And   • polyethylene glycol/lytes (MIRALAX) PACKET 1 Packet  1 Packet QDAY PRN Tunde Reed M.D.        And   • magnesium hydroxide (MILK OF MAGNESIA) suspension 30 mL  30 mL QDAY PRN Tunde Reed M.D.        And   • bisacodyl (DULCOLAX) suppository 10 mg  10 mg QDAY PRN Tunde Reed M.D.       • enoxaparin (LOVENOX) inj 40 mg  40 mg DAILY Tunde Reed M.D.   40 mg at 01/25/18 0810   • ipratropium-albuterol (DUONEB) nebulizer solution 3 mL  3 mL Q2HRS PRN (RT) Tunde Reed M.D.         Last reviewed on 1/24/2018  5:37 AM by Michelle Chong R.N.    Quality  Measures:  EKG reviewed, Medications reviewed, Labs reviewed and Radiology images reviewed  Cheek catheter: No Cheek      DVT Prophylaxis: Enoxaparin (Lovenox)  DVT prophylaxis - mechanical: SCDs  Ulcer prophylaxis: Not indicated          Problems/Plan:    VDRF   - intubated 1/22   - extubated 1/23   - cont RT/O2 protocols   - cont IS as needed  Suicidal Ideation   - psych following   - legal hold in place   - awaiting transfer to inpatient psych facility  Depression   - sertraline  Prophylaxis   - lovenox    Pt's exam, assessment, and plan remains unchanged.  Pt is still medically cleared to transfer to an inpatient psychiatric facility    Discussed patient condition and risk of morbidity and/or mortality with RN, RT, Therapies, Pharmacy, Dietary, , Charge nurse / hot rounds, Patient and hospitalist.    [id number]   Spontaneous, unlabored and symmetrical

## 2023-01-01 NOTE — CARE PLAN
Problem: Safety  Goal: Will remain free from injury  Q15 minute obs in place.    Problem: Pain Management  Goal: Pain level will decrease to patient's comfort goal  Pt medicated per MAR. Non-pharmacological interventions per flowsheets.       5180